# Patient Record
Sex: FEMALE | Race: WHITE | Employment: OTHER | URBAN - NONMETROPOLITAN AREA
[De-identification: names, ages, dates, MRNs, and addresses within clinical notes are randomized per-mention and may not be internally consistent; named-entity substitution may affect disease eponyms.]

---

## 2021-05-17 ENCOUNTER — OUTSIDE SERVICES (OUTPATIENT)
Dept: PRIMARY CARE CLINIC | Age: 81
End: 2021-05-17

## 2021-05-17 DIAGNOSIS — L89.92 PRESSURE INJURY, STAGE 2, UNSPECIFIED LOCATION (HCC): ICD-10-CM

## 2021-05-17 DIAGNOSIS — Z79.4 TYPE 2 DIABETES MELLITUS WITH DIABETIC NEUROPATHY, WITH LONG-TERM CURRENT USE OF INSULIN (HCC): ICD-10-CM

## 2021-05-17 DIAGNOSIS — G89.29 CHRONIC BACK PAIN GREATER THAN 3 MONTHS DURATION: Primary | ICD-10-CM

## 2021-05-17 DIAGNOSIS — E11.40 TYPE 2 DIABETES MELLITUS WITH DIABETIC NEUROPATHY, WITH LONG-TERM CURRENT USE OF INSULIN (HCC): ICD-10-CM

## 2021-05-17 DIAGNOSIS — I27.20 PHT (PULMONARY HYPERTENSION) (HCC): ICD-10-CM

## 2021-05-17 DIAGNOSIS — I48.91 ATRIAL FIBRILLATION, UNSPECIFIED TYPE (HCC): ICD-10-CM

## 2021-05-17 DIAGNOSIS — R29.898 WEAKNESS OF BOTH LEGS: ICD-10-CM

## 2021-05-17 DIAGNOSIS — I89.0 LYMPHEDEMA OF BOTH LOWER EXTREMITIES: ICD-10-CM

## 2021-05-17 DIAGNOSIS — E78.00 PURE HYPERCHOLESTEROLEMIA: ICD-10-CM

## 2021-05-17 DIAGNOSIS — S91.302D UNSPECIFIED OPEN WOUND, LEFT FOOT, SUBSEQUENT ENCOUNTER: ICD-10-CM

## 2021-05-17 DIAGNOSIS — M54.9 CHRONIC BACK PAIN GREATER THAN 3 MONTHS DURATION: Primary | ICD-10-CM

## 2021-05-17 DIAGNOSIS — J90 PLEURAL EFFUSION: ICD-10-CM

## 2021-05-17 DIAGNOSIS — I50.9 HEART FAILURE, UNSPECIFIED HF CHRONICITY, UNSPECIFIED HEART FAILURE TYPE (HCC): ICD-10-CM

## 2021-05-17 DIAGNOSIS — E66.01 MORBID OBESITY (HCC): ICD-10-CM

## 2021-05-17 NOTE — PROGRESS NOTES
Patient:  Alejandra Graft, 1940  I saw this patient on 5/17/2021, at [facility]. She had fall and back injury months ago,was in hospital and snf before coming here. Continues to have back pain ,bilat leg weakness and has pressure ulcers. Wants to try pt for ambulation. currently wheel chair bound  She is morbidly obese and not able toloose weight  Blood sugars fluctuate  She has been on pain meds for her back pain  Reason for Visit:      ICD-10-CM    1. Chronic back pain greater than 3 months duration  M54.9     G89.29    2. Type 2 diabetes mellitus with diabetic neuropathy, with long-term current use of insulin ([facility])  E11.40     Z79.4    3. Morbid obesity ([address]  E66.01    4. Lymphedema of both lower extremities  I89.0    5. Weakness of both legs  R29.898    6. Pressure injury, stage 2, unspecified location ([facility] [address]  L89.92        Changes since admission:   Has pressure ulcers  Blood sugars fluctuate  1. Fall:  none  2. Behavioral Change: anxious  3. Pain Control: adequate  4. Mobility: decreased  5. Pressure Sore:  Has stage 2 ulcers  Prior to Admission medications    Not on File     Allergies:  Patient has no allergy information on record. Past Medical History:    Past Medical History:   Diagnosis Date    Chronic back pain greater than 3 months duration     Essential hypertension     Lymphedema of both lower extremities     Morbid obesity ([address]     Type 2 diabetes mellitus with diabetic polyneuropathy, with long-term current use of insulin ([facility])        Past Surgical History:    History reviewed. No pertinent surgical history. Social History:   Social History     Tobacco Use    Smoking status: Never Smoker    Smokeless tobacco: Never Used   Substance Use Topics    Alcohol use: Never       Family History:   No family history on file.         Review of Systems:  Constitutional: negative for fevers or chills  Eyes: negative for visual disturbance   ENT: negative for sore throat or nasal congestion,negative for dysphagia  Respiratory: negative for shortness of breath , cough  Cardiovascular: neg  for chest pain , palpitations,pnd,positive for leg edema  Gastrointestinal: neg for abd pain, nausea, vomiting, diarrhea or constipation,no amena,no blood in stool  Genitourinary: negative for dysuria, urgency,hematuria or frequency  Integument/breast: negative for skin rash or lesions  Neurological: positive for bilateral weakness, numbness of legs. Muscular Skeletal: has chronic back pain , has deformity lt foot  Psych -anxious    Objective:    Vitals:   BP-130/60,Pulse-56,Respi-18,Temp-97.3  -----------------------------------------------------------------  Exam:  GEN:   A & O x3, no apparent distress,obese  EYES: No gross abnormalities. and PERRL  ENT:right and left TM normal without fluid or infection, neck without nodes and throat normal without erythema or exudate  NECK: normal, supple, no lymphadenopathy,  no carotid bruits  PULM: clear to auscultation bilaterally- no wheezes, rales or rhonchi, normal air movement, no respiratory distress  COR: regular rate & rhythm, no murmurs and no gallops  ABD:  soft, non-tender, non-distended, normal bowel sounds, no masses or organomegaly  : no cva or flank tenderness  EXT:has bilat leg edema,no calf tenderness, and warm to touch. NEURO: Motor - has bilat leg weakness and has diminished sensations of feet  PSYCH: anxious  SKIN:  Has stage 2 pressure ulcers over gluteal regions and has deformed nails  -----------------------------------------------------------------  Diagnostic Data:   · All diagnostic data was reviewed    Assessment:        ICD-10-CM    1. Chronic back pain greater than 3 months duration  M54.9     G89.29    2. Type 2 diabetes mellitus with diabetic neuropathy, with long-term current use of insulin ([facility])  E11.40     Z79.4    3. Morbid obesity ([facility] [address]  E66.01    4. Lymphedema of both lower extremities  I89.0    5.  Weakness of both legs R29.898    6. Pressure injury, stage 2, unspecified location [facility])  L89.92      There is no problem list on file for this patient.         Plan:     Pt and ot  Pain control  Monitor blood sugars,bp  Continue current medications  Treat pressure ulcers  Prevent falls    Electronically signed by Anders Fernando MD on 5/18/2021 at 9:54 AM

## 2021-05-18 ENCOUNTER — HOSPITAL ENCOUNTER (OUTPATIENT)
Age: 81
Setting detail: SPECIMEN
Discharge: HOME | End: 2021-05-18
Payer: MEDICARE

## 2021-05-18 PROBLEM — M62.82 RHABDOMYOLYSIS: Status: ACTIVE | Noted: 2021-05-18

## 2021-05-18 PROBLEM — E78.00 PURE HYPERCHOLESTEROLEMIA: Status: ACTIVE | Noted: 2021-05-18

## 2021-05-18 PROBLEM — I50.9 HEART FAILURE, UNSPECIFIED (HCC): Status: ACTIVE | Noted: 2021-05-18

## 2021-05-18 PROBLEM — I48.91 ATRIAL FIBRILLATION (HCC): Status: ACTIVE | Noted: 2021-05-18

## 2021-05-18 PROBLEM — J90 PLEURAL EFFUSION: Status: ACTIVE | Noted: 2021-05-18

## 2021-05-18 PROBLEM — I27.20 PHT (PULMONARY HYPERTENSION) (HCC): Status: ACTIVE | Noted: 2021-05-18

## 2021-05-18 PROBLEM — S91.302D UNSPECIFIED OPEN WOUND, LEFT FOOT, SUBSEQUENT ENCOUNTER: Status: ACTIVE | Noted: 2021-05-18

## 2021-05-18 PROBLEM — E10.9 DIABETES MELLITUS TYPE I (HCC): Status: ACTIVE | Noted: 2021-05-18

## 2021-05-18 LAB
ABSOLUTE EOS #: 0.41 K/UL (ref 0–0.44)
ABSOLUTE IMMATURE GRANULOCYTE: <0.03 K/UL (ref 0–0.3)
ABSOLUTE LYMPH #: 2.23 K/UL (ref 1.1–3.7)
ABSOLUTE MONO #: 0.81 K/UL (ref 0.1–1.2)
ALBUMIN SERPL-MCNC: 3.3 G/DL (ref 3.5–5.2)
ALBUMIN/GLOBULIN RATIO: 1 (ref 1–2.5)
ALP BLD-CCNC: 105 U/L (ref 35–104)
ALT SERPL-CCNC: 97 U/L (ref 5–33)
ANION GAP SERPL CALCULATED.3IONS-SCNC: 11 MMOL/L (ref 9–17)
AST SERPL-CCNC: 56 U/L
BASOPHILS # BLD: 0 % (ref 0–2)
BASOPHILS ABSOLUTE: <0.03 K/UL (ref 0–0.2)
BILIRUB SERPL-MCNC: 0.36 MG/DL (ref 0.3–1.2)
BUN BLDV-MCNC: 29 MG/DL (ref 8–23)
BUN/CREAT BLD: 30 (ref 9–20)
CALCIUM SERPL-MCNC: 9.6 MG/DL (ref 8.6–10.4)
CHLORIDE BLD-SCNC: 104 MMOL/L (ref 98–107)
CHOLESTEROL/HDL RATIO: 3.7
CHOLESTEROL: 142 MG/DL
CO2: 25 MMOL/L (ref 20–31)
CREAT SERPL-MCNC: 0.98 MG/DL (ref 0.5–0.9)
DIFFERENTIAL TYPE: ABNORMAL
EOSINOPHILS RELATIVE PERCENT: 5 % (ref 1–4)
ESTIMATED AVERAGE GLUCOSE: 134 MG/DL
GFR AFRICAN AMERICAN: >60 ML/MIN
GFR NON-AFRICAN AMERICAN: 54 ML/MIN
GFR SERPL CREATININE-BSD FRML MDRD: ABNORMAL ML/MIN/{1.73_M2}
GFR SERPL CREATININE-BSD FRML MDRD: ABNORMAL ML/MIN/{1.73_M2}
GLUCOSE BLD-MCNC: 87 MG/DL (ref 70–99)
HBA1C MFR BLD: 6.3 % (ref 4–6)
HCT VFR BLD CALC: 35.5 % (ref 36.3–47.1)
HDLC SERPL-MCNC: 38 MG/DL
HEMOGLOBIN: 11.5 G/DL (ref 11.9–15.1)
IMMATURE GRANULOCYTES: 0 %
LDL CHOLESTEROL: 87 MG/DL (ref 0–130)
LYMPHOCYTES # BLD: 28 % (ref 24–43)
MCH RBC QN AUTO: 33.1 PG (ref 25.2–33.5)
MCHC RBC AUTO-ENTMCNC: 32.4 G/DL (ref 28.4–34.8)
MCV RBC AUTO: 102.3 FL (ref 82.6–102.9)
MONOCYTES # BLD: 10 % (ref 3–12)
NRBC AUTOMATED: 0 PER 100 WBC
PDW BLD-RTO: 15.1 % (ref 11.8–14.4)
PLATELET # BLD: 241 K/UL (ref 138–453)
PLATELET ESTIMATE: ABNORMAL
PMV BLD AUTO: 10.4 FL (ref 8.1–13.5)
POTASSIUM SERPL-SCNC: 4.4 MMOL/L (ref 3.7–5.3)
RBC # BLD: 3.47 M/UL (ref 3.95–5.11)
RBC # BLD: ABNORMAL 10*6/UL
SEG NEUTROPHILS: 57 % (ref 36–65)
SEGMENTED NEUTROPHILS ABSOLUTE COUNT: 4.39 K/UL (ref 1.5–8.1)
SODIUM BLD-SCNC: 140 MMOL/L (ref 135–144)
TOTAL PROTEIN: 6.6 G/DL (ref 6.4–8.3)
TRIGL SERPL-MCNC: 86 MG/DL
TSH SERPL DL<=0.05 MIU/L-ACNC: 3.09 MIU/L (ref 0.3–5)
VLDLC SERPL CALC-MCNC: ABNORMAL MG/DL (ref 1–30)
WBC # BLD: 7.9 K/UL (ref 3.5–11.3)
WBC # BLD: ABNORMAL 10*3/UL

## 2021-05-18 PROCEDURE — 83036 HEMOGLOBIN GLYCOSYLATED A1C: CPT

## 2021-05-18 PROCEDURE — 36415 COLL VENOUS BLD VENIPUNCTURE: CPT

## 2021-05-18 PROCEDURE — 85025 COMPLETE CBC W/AUTO DIFF WBC: CPT

## 2021-05-18 PROCEDURE — P9604 ONE-WAY ALLOW PRORATED TRIP: HCPCS

## 2021-05-18 PROCEDURE — 84443 ASSAY THYROID STIM HORMONE: CPT

## 2021-05-18 PROCEDURE — 80053 COMPREHEN METABOLIC PANEL: CPT

## 2021-05-18 PROCEDURE — 80061 LIPID PANEL: CPT

## 2021-05-18 RX ORDER — BUMETANIDE 1 MG/1
1 TABLET ORAL DAILY
COMMUNITY

## 2021-05-18 RX ORDER — FENTANYL 12 UG/H
1 PATCH TRANSDERMAL
COMMUNITY

## 2021-05-18 RX ORDER — PROBENECID 500 MG/1
500 TABLET, FILM COATED ORAL 2 TIMES DAILY
COMMUNITY

## 2021-05-18 RX ORDER — INSULIN GLARGINE 100 [IU]/ML
44 INJECTION, SOLUTION SUBCUTANEOUS NIGHTLY
COMMUNITY

## 2021-05-18 RX ORDER — NICOTINE POLACRILEX 2 MG
1 GUM BUCCAL DAILY
COMMUNITY

## 2021-05-18 RX ORDER — METFORMIN HYDROCHLORIDE 500 MG/1
500 TABLET, EXTENDED RELEASE ORAL
COMMUNITY

## 2021-05-18 RX ORDER — ROPINIROLE 2 MG/1
2.5 TABLET, FILM COATED ORAL NIGHTLY
COMMUNITY

## 2021-05-18 RX ORDER — NYSTATIN 100000 [USP'U]/G
POWDER TOPICAL 2 TIMES DAILY
COMMUNITY

## 2021-05-18 RX ORDER — LEVOTHYROXINE SODIUM 0.1 MG/1
100 TABLET ORAL DAILY
COMMUNITY

## 2021-05-18 RX ORDER — POLYETHYLENE GLYCOL 3350 17 G/17G
17 POWDER, FOR SOLUTION ORAL DAILY
COMMUNITY

## 2021-05-18 RX ORDER — OXYCODONE HYDROCHLORIDE 5 MG/1
5 TABLET ORAL EVERY 4 HOURS PRN
COMMUNITY

## 2021-05-18 RX ORDER — INSULIN LISPRO 100 [IU]/ML
18 INJECTION, SOLUTION SUBCUTANEOUS
COMMUNITY

## 2021-05-18 RX ORDER — SPIRONOLACTONE 25 MG/1
25 TABLET ORAL DAILY
COMMUNITY

## 2021-05-18 RX ORDER — POTASSIUM CHLORIDE 750 MG/1
10 CAPSULE, EXTENDED RELEASE ORAL DAILY
COMMUNITY

## 2021-05-18 RX ORDER — ALENDRONATE SODIUM 70 MG/1
70 TABLET ORAL
COMMUNITY

## 2021-05-18 RX ORDER — M-VIT,TX,IRON,MINS/CALC/FOLIC 27MG-0.4MG
1 TABLET ORAL DAILY
COMMUNITY

## 2021-05-18 RX ORDER — INSULIN LISPRO 100 [IU]/ML
12 INJECTION, SOLUTION SUBCUTANEOUS 2 TIMES DAILY
COMMUNITY

## 2021-05-18 RX ORDER — ACETAMINOPHEN 500 MG
1000 TABLET ORAL EVERY 6 HOURS PRN
COMMUNITY

## 2021-05-18 RX ORDER — DIPHENHYDRAMINE HCL 25 MG
25 TABLET ORAL
COMMUNITY

## 2021-05-18 SDOH — HEALTH STABILITY: PHYSICAL HEALTH: ON AVERAGE, HOW MANY MINUTES DO YOU ENGAGE IN EXERCISE AT THIS LEVEL?: 0 MIN

## 2021-05-19 ENCOUNTER — OUTSIDE SERVICES (OUTPATIENT)
Dept: PRIMARY CARE CLINIC | Age: 81
End: 2021-05-19

## 2021-05-19 DIAGNOSIS — Z79.4 TYPE 2 DIABETES MELLITUS WITH DIABETIC NEUROPATHY, WITH LONG-TERM CURRENT USE OF INSULIN (HCC): ICD-10-CM

## 2021-05-19 DIAGNOSIS — I48.91 ATRIAL FIBRILLATION, UNSPECIFIED TYPE (HCC): ICD-10-CM

## 2021-05-19 DIAGNOSIS — E11.40 TYPE 2 DIABETES MELLITUS WITH DIABETIC NEUROPATHY, WITH LONG-TERM CURRENT USE OF INSULIN (HCC): ICD-10-CM

## 2021-05-19 DIAGNOSIS — G89.29 CHRONIC BACK PAIN GREATER THAN 3 MONTHS DURATION: Primary | ICD-10-CM

## 2021-05-19 DIAGNOSIS — E66.01 MORBID OBESITY (HCC): ICD-10-CM

## 2021-05-19 DIAGNOSIS — L89.92 PRESSURE INJURY, STAGE 2, UNSPECIFIED LOCATION (HCC): ICD-10-CM

## 2021-05-19 DIAGNOSIS — R29.898 WEAKNESS OF BOTH LEGS: ICD-10-CM

## 2021-05-19 DIAGNOSIS — M54.9 CHRONIC BACK PAIN GREATER THAN 3 MONTHS DURATION: Primary | ICD-10-CM

## 2021-05-19 NOTE — PROGRESS NOTES
MD   insulin glargine (LANTUS SOLOSTAR) 100 UNIT/ML injection pen Inject 44 Units into the skin nightly    Historical Provider, MD   levothyroxine (SYNTHROID) 100 MCG tablet Take 100 mcg by mouth Daily    Historical Provider, MD   Melatonin 1 MG CAPS Take 1 mg by mouth nightly    Historical Provider, MD   metFORMIN (GLUCOPHAGE-XR) 500 MG extended release tablet Take 500 mg by mouth daily (with breakfast)    Historical Provider, MD   magnesium hydroxide (MILK OF MAGNESIA) 400 MG/5ML suspension Take 30 mLs by mouth daily as needed for Constipation    Historical Provider, MD   Multiple Vitamins-Minerals (THERAPEUTIC MULTIVITAMIN-MINERALS) tablet Take 1 tablet by mouth daily    Historical Provider, MD   nystatin (MYCOSTATIN) 527773 UNIT/GM powder Apply topically 2 times daily Apply topically 4 times daily. Historical Provider, MD   oxyCODONE (ROXICODONE) 5 MG immediate release tablet Take 5 mg by mouth every 4 hours as needed for Pain.     Historical Provider, MD   potassium chloride (MICRO-K) 10 MEQ extended release capsule Take 10 mEq by mouth daily    Historical Provider, MD   probenecid (BENEMID) 500 MG tablet Take 500 mg by mouth 2 times daily    Historical Provider, MD   rOPINIRole (REQUIP) 0.5 MG tablet Take 0.5 mg by mouth daily    Historical Provider, MD   spironolactone (ALDACTONE) 25 MG tablet Take 25 mg by mouth daily    Historical Provider, MD   rivaroxaban (XARELTO) 20 MG TABS tablet Take 20 mg by mouth nightly    Historical Provider, MD     Allergies:  Daypro [oxaprozin], Sulindac, Uloric [febuxostat], and Allopurinol    Past Medical History:    Past Medical History:   Diagnosis Date    Atrial fibrillation ([facility] [address] 5/18/2021    Chronic back pain greater than 3 months duration     Chronic kidney disease     Diabetes mellitus type I ([facility] [address] 5/18/2021    Essential hypertension     Heart failure, unspecified ([facility] 5/18/2021    Lymphedema of both lower extremities     Morbid obesity ([facility] [address]     PHT EXT:has bilat leg edema,no calf tenderness, and warm to touch. NEURO: Motor - has bilat leg weakness and has diminished sensations of feet  PSYCH: anxious  SKIN:  Has stage 2 pressure ulcers over gluteal regions and has deformed nails  -----------------------------------------------------------------  Diagnostic Data:   · All diagnostic data was reviewed    Assessment:        ICD-10-CM    1. Chronic back pain greater than 3 months duration  M54.9     G89.29    2. Type 2 diabetes mellitus with diabetic neuropathy, with long-term current use of insulin ([facility])  E11.40     Z79.4    3. Morbid obesity ([facility]  E66.01    4. Weakness of both legs  R29.898    5. Pressure injury, stage 2, unspecified location ([facility]  L89.92    6.  Atrial fibrillation, unspecified type [facility])  I48.91      Patient Active Problem List   Diagnosis Code    Unspecified open wound, left foot, subsequent encounter S91.302D    Heart failure, unspecified ([facility] I50.9    Pure hypercholesterolemia E78.00    PHT (pulmonary hypertension) ([facility] I27.20    Rhabdomyolysis M62.82    Pleural effusion J90    Atrial fibrillation ([facility] I48.91    Diabetes mellitus type I ([facility] E10.9         Plan:     Pt and ot  Pain control  Monitor blood sugars,bp  Continue current medications  Treat pressure ulcers  Prevent falls    Electronically signed by K[address], MD on 5/21/2021 at 10:52 AM

## 2021-05-24 ENCOUNTER — OUTSIDE SERVICES (OUTPATIENT)
Dept: FAMILY MEDICINE CLINIC | Age: 81
End: 2021-05-24

## 2021-05-24 DIAGNOSIS — R29.898 BILATERAL LEG WEAKNESS: ICD-10-CM

## 2021-05-24 DIAGNOSIS — E66.01 MORBID OBESITY (HCC): ICD-10-CM

## 2021-05-24 DIAGNOSIS — G89.29 OTHER CHRONIC BACK PAIN: Primary | ICD-10-CM

## 2021-05-24 DIAGNOSIS — I48.91 ATRIAL FIBRILLATION, UNSPECIFIED TYPE (HCC): ICD-10-CM

## 2021-05-24 DIAGNOSIS — M54.9 OTHER CHRONIC BACK PAIN: Primary | ICD-10-CM

## 2021-05-24 NOTE — PROGRESS NOTES
100 MCG tablet Take 100 mcg by mouth Daily    Historical Provider, MD   Melatonin 1 MG CAPS Take 1 mg by mouth nightly    Historical Provider, MD   metFORMIN (GLUCOPHAGE-XR) 500 MG extended release tablet Take 500 mg by mouth daily (with breakfast)    Historical Provider, MD   magnesium hydroxide (MILK OF MAGNESIA) 400 MG/5ML suspension Take 30 mLs by mouth daily as needed for Constipation    Historical Provider, MD   Multiple Vitamins-Minerals (THERAPEUTIC MULTIVITAMIN-MINERALS) tablet Take 1 tablet by mouth daily    Historical Provider, MD   nystatin (MYCOSTATIN) 027002 UNIT/GM powder Apply topically 2 times daily Apply topically 4 times daily. Historical Provider, MD   oxyCODONE (ROXICODONE) 5 MG immediate release tablet Take 5 mg by mouth every 4 hours as needed for Pain.     Historical Provider, MD   potassium chloride (MICRO-K) 10 MEQ extended release capsule Take 10 mEq by mouth daily    Historical Provider, MD   probenecid (BENEMID) 500 MG tablet Take 500 mg by mouth 2 times daily    Historical Provider, MD   rOPINIRole (REQUIP) 0.5 MG tablet Take 0.5 mg by mouth daily    Historical Provider, MD   spironolactone (ALDACTONE) 25 MG tablet Take 25 mg by mouth daily    Historical Provider, MD   rivaroxaban (XARELTO) 20 MG TABS tablet Take 20 mg by mouth nightly    Historical Provider, MD     Allergies:  Daypro [oxaprozin], Sulindac, Uloric [febuxostat], and Allopurinol    Past Medical History:    Past Medical History:   Diagnosis Date    Atrial fibrillation ([facility] [address] 5/18/2021    Chronic back pain greater than 3 months duration     Chronic kidney disease     Diabetes mellitus type I ([facility] [address] 5/18/2021    Essential hypertension     Heart failure, unspecified ([facility] [address] 5/18/2021    Lymphedema of both lower extremities     Morbid obesity ([address]     PHT (pulmonary hypertension) ([facility] 5/18/2021    Pleural effusion 5/18/2021    Pure hypercholesterolemia 5/18/2021    Rhabdomyolysis 5/18/2021    Sleep apnea     Type 2 diabetes mellitus with diabetic polyneuropathy, with long-term current use of insulin ([facility] [address]        Past Surgical History:    No past surgical history on file. Social History:   Social History     Tobacco Use    Smoking status: Never Smoker    Smokeless tobacco: Never Used   Substance Use Topics    Alcohol use: Never       Family History:   No family history on file. Review of Systems:  Constitutional: negative for fevers or chills  Eyes: negative for visual disturbance   ENT: negative for sore throat or nasal congestion,negative for dysphagia  Respiratory: negative for shortness of breath , cough  Cardiovascular: neg  for chest pain , palpitations,pnd,positive for leg edema  Gastrointestinal: neg for abd pain, nausea, vomiting, diarrhea or constipation,no amena,no blood in stool  Genitourinary: negative for dysuria, urgency,hematuria or frequency  Integument/breast: negative for skin rash or lesions  Neurological: positive for bilateral weakness, numbness of legs. Muscular Skeletal: has chronic back pain , has deformity lt foot  Psych -anxious    Objective:    Vitals:   BP-138/76,Pulse-66,Respi-18,Temp-97.0  -----------------------------------------------------------------  Exam:  GEN:   A & O x3, no apparent distress,obese  EYES: No gross abnormalities. and PERRL  ENT:right and left TM normal without fluid or infection, neck without nodes and throat normal without erythema or exudate  NECK: normal, supple, no lymphadenopathy,  no carotid bruits  PULM: clear to auscultation bilaterally- no wheezes, rales or rhonchi, normal air movement, no respiratory distress  COR: regular rate & rhythm, no murmurs and no gallops  ABD:  soft, non-tender, non-distended, normal bowel sounds, no masses or organomegaly  : no cva or flank tenderness  EXT:has bilat leg edema,no calf tenderness, and warm to touch.    NEURO: Motor - has bilat leg weakness and has diminished sensations of feet  PSYCH: anxious  SKIN:  Has

## 2021-05-26 ENCOUNTER — OUTSIDE SERVICES (OUTPATIENT)
Dept: PRIMARY CARE CLINIC | Age: 81
End: 2021-05-26

## 2021-05-26 DIAGNOSIS — E11.40 TYPE 2 DIABETES MELLITUS WITH DIABETIC NEUROPATHY, WITH LONG-TERM CURRENT USE OF INSULIN (HCC): ICD-10-CM

## 2021-05-26 DIAGNOSIS — R29.898 WEAKNESS OF BOTH LEGS: ICD-10-CM

## 2021-05-26 DIAGNOSIS — L89.92 PRESSURE INJURY, STAGE 2, UNSPECIFIED LOCATION (HCC): ICD-10-CM

## 2021-05-26 DIAGNOSIS — G89.29 CHRONIC BACK PAIN GREATER THAN 3 MONTHS DURATION: Primary | ICD-10-CM

## 2021-05-26 DIAGNOSIS — Z79.4 TYPE 2 DIABETES MELLITUS WITH DIABETIC NEUROPATHY, WITH LONG-TERM CURRENT USE OF INSULIN (HCC): ICD-10-CM

## 2021-05-26 DIAGNOSIS — M54.9 CHRONIC BACK PAIN GREATER THAN 3 MONTHS DURATION: Primary | ICD-10-CM

## 2021-05-26 DIAGNOSIS — E66.01 MORBID OBESITY (HCC): ICD-10-CM

## 2021-05-26 NOTE — PROGRESS NOTES
Patient:  Brendan Browning, 1940  I saw this patient on 5/26/2021, at [facility]. Has pain on movement  Continues to have back pain ,bilat leg weakness and has pressure ulcers. inspite of her pain meds,still rates her pain high  Blood sugars fluctuate    Reason for Visit:      ICD-10-CM    1. Chronic back pain greater than 3 months duration  M54.9     G89.29    2. Type 2 diabetes mellitus with diabetic neuropathy, with long-term current use of insulin ([facility])  E11.40     Z79.4    3. Morbid obesity ([facility] [address]  E66.01    4. Weakness of both legs  R29.898    5. Pressure injury, stage 2, unspecified location ([facility] [address]  L89.92        Changes since last visit:   Has pressure ulcers  Has pain  Blood sugars fluctuate  1. Fall:  none  2. Behavioral Change: anxious  3. Pain Control: inadequate  4. Mobility: decreased  5. Pressure Sore:  Has stage 2 ulcers  Prior to Admission medications    Medication Sig Start Date End Date Taking? Authorizing Provider   acetaminophen (TYLENOL) 500 MG tablet Take 1,000 mg by mouth every 6 hours as needed for Pain    Historical Provider, MD   alendronate (FOSAMAX) 70 MG tablet Take 70 mg by mouth every 7 days    Historical Provider, MD   diphenhydrAMINE (BENADRYL) 25 MG tablet Take 25 mg by mouth every 8-12 hours as needed for Itching    Historical Provider, MD   Biotin 1 MG CAPS Take 1 mg by mouth daily    Historical Provider, MD   bumetanide (BUMEX) 1 MG tablet Take 1 mg by mouth daily    Historical Provider, MD   fentaNYL (DURAGESIC) 12 MCG/HR Place 1 patch onto the skin every 72 hours.     Historical Provider, MD   polyethylene glycol (MIRALAX) 17 GM/SCOOP powder Take 17 g by mouth daily    Historical Provider, MD   insulin lispro, 0.5 Unit Dial, (HUMALOG KINGS KWIKPEN) 100 UNIT/ML SOPN Inject 12 Units into the skin 2 times daily    Historical Provider, MD   insulin lispro, 0.5 Unit Dial, (HUMALOG KINGS KWIKPEN) 100 UNIT/ML SOPN Inject 18 Units into the skin Daily with lunch Historical Provider, MD   insulin glargine (LANTUS SOLOSTAR) 100 UNIT/ML injection pen Inject 44 Units into the skin nightly    Historical Provider, MD   levothyroxine (SYNTHROID) 100 MCG tablet Take 100 mcg by mouth Daily    Historical Provider, MD   Melatonin 1 MG CAPS Take 1 mg by mouth nightly    Historical Provider, MD   metFORMIN (GLUCOPHAGE-XR) 500 MG extended release tablet Take 500 mg by mouth daily (with breakfast)    Historical Provider, MD   magnesium hydroxide (MILK OF MAGNESIA) 400 MG/5ML suspension Take 30 mLs by mouth daily as needed for Constipation    Historical Provider, MD   Multiple Vitamins-Minerals (THERAPEUTIC MULTIVITAMIN-MINERALS) tablet Take 1 tablet by mouth daily    Historical Provider, MD   nystatin (MYCOSTATIN) 543719 UNIT/GM powder Apply topically 2 times daily Apply topically 4 times daily. Historical Provider, MD   oxyCODONE (ROXICODONE) 5 MG immediate release tablet Take 5 mg by mouth every 4 hours as needed for Pain.     Historical Provider, MD   potassium chloride (MICRO-K) 10 MEQ extended release capsule Take 10 mEq by mouth daily    Historical Provider, MD   probenecid (BENEMID) 500 MG tablet Take 500 mg by mouth 2 times daily    Historical Provider, MD   rOPINIRole (REQUIP) 0.5 MG tablet Take 0.5 mg by mouth daily    Historical Provider, MD   spironolactone (ALDACTONE) 25 MG tablet Take 25 mg by mouth daily    Historical Provider, MD   rivaroxaban (XARELTO) 20 MG TABS tablet Take 20 mg by mouth nightly    Historical Provider, MD     Allergies:  Daypro [oxaprozin], Sulindac, Uloric [febuxostat], and Allopurinol    Past Medical History:    Past Medical History:   Diagnosis Date    Atrial fibrillation ([facility] [address] 5/18/2021    Chronic back pain greater than 3 months duration     Chronic kidney disease     Diabetes mellitus type I ([facility] [address] 5/18/2021    Essential hypertension     Heart failure, unspecified ([facility] [address] 5/18/2021    Lymphedema of both lower extremities     Morbid obesity ([facility]     PHT (pulmonary hypertension) ([facility] 5/18/2021    Pleural effusion 5/18/2021    Pure hypercholesterolemia 5/18/2021    Rhabdomyolysis 5/18/2021    Sleep apnea     Type 2 diabetes mellitus with diabetic polyneuropathy, with long-term current use of insulin ([facility]        Past Surgical History:    No past surgical history on file. Social History:   Social History     Tobacco Use    Smoking status: Never Smoker    Smokeless tobacco: Never Used   Substance Use Topics    Alcohol use: Never       Family History:   No family history on file. Review of Systems:  Constitutional: negative for fevers or chills  Eyes: negative for visual disturbance   ENT: negative for sore throat or nasal congestion,negative for dysphagia  Respiratory: negative for shortness of breath , cough  Cardiovascular: neg  for chest pain , palpitations,pnd,positive for leg edema  Gastrointestinal: neg for abd pain, nausea, vomiting, diarrhea or constipation,no amena,no blood in stool  Genitourinary: negative for dysuria, urgency,hematuria or frequency  Integument/breast: negative for skin rash or lesions  Neurological: positive for bilateral weakness, numbness of legs. Muscular Skeletal: has chronic back pain , has deformity lt foot  Psych -anxious    Objective:    Vitals:   BP-128/68,Pulse-68,Respi-18,Temp-97.6  -----------------------------------------------------------------  Exam:  GEN:   A & O x3, no apparent distress,obese  EYES: No gross abnormalities.  and PERRL  ENT:right and left TM normal without fluid or infection, neck without nodes and throat normal without erythema or exudate  NECK: normal, supple, no lymphadenopathy,  no carotid bruits  PULM: clear to auscultation bilaterally- no wheezes, rales or rhonchi, normal air movement, no respiratory distress  COR: regular rate & rhythm, no murmurs and no gallops  ABD:  soft, non-tender, non-distended, normal bowel sounds, no masses or organomegaly  : no cva or flank

## 2021-05-29 ENCOUNTER — HOSPITAL ENCOUNTER (EMERGENCY)
Age: 81
Discharge: ANOTHER HOSPITAL | End: 2021-05-30
Attending: EMERGENCY MEDICINE
Payer: MEDICARE

## 2021-05-29 DIAGNOSIS — S22.000A COMPRESSION FRACTURE OF BODY OF THORACIC VERTEBRA (HCC): Primary | ICD-10-CM

## 2021-05-29 PROCEDURE — 51702 INSERT TEMP BLADDER CATH: CPT

## 2021-05-29 PROCEDURE — 6360000002 HC RX W HCPCS: Performed by: EMERGENCY MEDICINE

## 2021-05-29 PROCEDURE — 96374 THER/PROPH/DIAG INJ IV PUSH: CPT

## 2021-05-29 PROCEDURE — 99285 EMERGENCY DEPT VISIT HI MDM: CPT

## 2021-05-29 PROCEDURE — 96375 TX/PRO/DX INJ NEW DRUG ADDON: CPT

## 2021-05-29 RX ORDER — DULOXETIN HYDROCHLORIDE 20 MG/1
20 CAPSULE, DELAYED RELEASE ORAL DAILY
COMMUNITY

## 2021-05-29 RX ORDER — FENTANYL CITRATE 50 UG/ML
25 INJECTION, SOLUTION INTRAMUSCULAR; INTRAVENOUS ONCE
Status: COMPLETED | OUTPATIENT
Start: 2021-05-30 | End: 2021-05-29

## 2021-05-29 RX ORDER — ONDANSETRON 2 MG/ML
4 INJECTION INTRAMUSCULAR; INTRAVENOUS ONCE
Status: COMPLETED | OUTPATIENT
Start: 2021-05-30 | End: 2021-05-29

## 2021-05-29 RX ADMIN — ONDANSETRON 4 MG: 2 INJECTION INTRAMUSCULAR; INTRAVENOUS at 23:59

## 2021-05-29 RX ADMIN — FENTANYL CITRATE 25 MCG: 50 INJECTION, SOLUTION INTRAMUSCULAR; INTRAVENOUS at 23:58

## 2021-05-29 ASSESSMENT — PAIN DESCRIPTION - ORIENTATION: ORIENTATION: MID

## 2021-05-29 ASSESSMENT — PAIN SCALES - GENERAL
PAINLEVEL_OUTOF10: 10
PAINLEVEL_OUTOF10: 10

## 2021-05-29 ASSESSMENT — PAIN DESCRIPTION - LOCATION: LOCATION: BACK

## 2021-05-29 ASSESSMENT — PAIN DESCRIPTION - PAIN TYPE: TYPE: ACUTE PAIN

## 2021-05-29 ASSESSMENT — PAIN DESCRIPTION - DESCRIPTORS: DESCRIPTORS: SPASM

## 2021-05-30 ENCOUNTER — APPOINTMENT (OUTPATIENT)
Dept: CT IMAGING | Age: 81
End: 2021-05-30
Payer: MEDICARE

## 2021-05-30 VITALS
HEART RATE: 83 BPM | TEMPERATURE: 97 F | RESPIRATION RATE: 18 BRPM | DIASTOLIC BLOOD PRESSURE: 43 MMHG | OXYGEN SATURATION: 92 % | SYSTOLIC BLOOD PRESSURE: 175 MMHG

## 2021-05-30 LAB — GLUCOSE BLD-MCNC: 83 MG/DL

## 2021-05-30 PROCEDURE — 6370000000 HC RX 637 (ALT 250 FOR IP): Performed by: FAMILY MEDICINE

## 2021-05-30 PROCEDURE — 72131 CT LUMBAR SPINE W/O DYE: CPT

## 2021-05-30 PROCEDURE — 72128 CT CHEST SPINE W/O DYE: CPT

## 2021-05-30 PROCEDURE — 72125 CT NECK SPINE W/O DYE: CPT

## 2021-05-30 RX ORDER — HYDROCODONE BITARTRATE AND ACETAMINOPHEN 5; 325 MG/1; MG/1
1 TABLET ORAL ONCE
Status: COMPLETED | OUTPATIENT
Start: 2021-05-30 | End: 2021-05-30

## 2021-05-30 RX ORDER — ACETAMINOPHEN 325 MG/1
650 TABLET ORAL ONCE
Status: COMPLETED | OUTPATIENT
Start: 2021-05-30 | End: 2021-05-30

## 2021-05-30 RX ADMIN — ACETAMINOPHEN 650 MG: 325 TABLET ORAL at 08:08

## 2021-05-30 RX ADMIN — HYDROCODONE BITARTRATE AND ACETAMINOPHEN 1 TABLET: 5; 325 TABLET ORAL at 08:51

## 2021-05-30 ASSESSMENT — PAIN SCALES - GENERAL
PAINLEVEL_OUTOF10: 5
PAINLEVEL_OUTOF10: 4

## 2021-05-30 ASSESSMENT — ENCOUNTER SYMPTOMS
BACK PAIN: 1
RESPIRATORY NEGATIVE: 1
EYES NEGATIVE: 1
GASTROINTESTINAL NEGATIVE: 1

## 2021-05-30 NOTE — ED PROVIDER NOTES
[address] [place] [address] ED  EMERGENCY DEPARTMENT ENCOUNTER      Pt Name: Love Doss  MRN: [id number]  [place] 1940  Date of evaluation: 5/29/2021  Provider: Elisa Mccullough MD    [address] [place] [address]       Chief Complaint   Patient presents with    Back Pain     mid back, onset yesterday after standing with gait belt. Patient states she felt a pop. Describes spasm pain this evening, not relieved with oxycodone/benadryl/tylenol. HISTORY OF PRESENT ILLNESS   (Location/Symptom, Timing/Onset, Context/Setting, Quality, Duration, Modifying Factors, Severity)  Note limiting factors. Love Doss is a 79 y.o. female who presents to the emergency department     79-year-old very pleasant female presents emergency department with a history for back discomfort. Patient relates that she broke her back in a number of places necessitating hospitalization at [address] [place] [address] in January. The patient currently resides at [facility]. Earlier today the patient related that she felt her back pop in her mid to lower back region when being placed in a stand lift device. The patient after sustaining her back fractures in January has had limited mobility of her legs. There is been no bowel or bladder incontinence. Nursing Notes were reviewed. REVIEW OF SYSTEMS    (2-9 systems for level 4, 10 or more for level 5)     Review of Systems   Constitutional: Negative. HENT: Negative. Eyes: Negative. Respiratory: Negative. Cardiovascular: Negative. Gastrointestinal: Negative. Endocrine: Negative. Genitourinary: Negative. Musculoskeletal: Positive for back pain and gait problem. Skin: Negative. Neurological: Positive for weakness (Involving her legs (since initial back fractures January 2021). Hematological: Negative. Except as noted above the remainder of the review of systems was reviewed and negative.        PAST MEDICAL HISTORY     Past Medical History: Diagnosis Date    Atrial fibrillation ([facility] 5/18/2021    Chronic back pain greater than 3 months duration     Chronic kidney disease     Diabetes mellitus type I ([facility] 5/18/2021    Essential hypertension     Heart failure, unspecified ([facility] 5/18/2021    Lymphedema of both lower extremities     Morbid obesity ([facility]     PHT (pulmonary hypertension) ([facility] 5/18/2021    Pleural effusion 5/18/2021    Pure hypercholesterolemia 5/18/2021    Rhabdomyolysis 5/18/2021    Sleep apnea     Type 2 diabetes mellitus with diabetic polyneuropathy, with long-term current use of insulin ([facility]          SURGICAL HISTORY     No past surgical history on file. CURRENT MEDICATIONS       Previous Medications    ACETAMINOPHEN (TYLENOL) 500 MG TABLET    Take 1,000 mg by mouth every 6 hours as needed for Pain    ALENDRONATE (FOSAMAX) 70 MG TABLET    Take 70 mg by mouth every 7 days    BIOTIN 1 MG CAPS    Take 1 mg by mouth daily    BUMETANIDE (BUMEX) 1 MG TABLET    Take 1 mg by mouth daily    DIPHENHYDRAMINE (BENADRYL) 25 MG TABLET    Take 25 mg by mouth every 8-12 hours as needed for Itching    DULOXETINE (CYMBALTA) 20 MG EXTENDED RELEASE CAPSULE    Take 20 mg by mouth daily    FENTANYL (DURAGESIC) 12 MCG/HR    Place 1 patch onto the skin every 72 hours.     INSULIN GLARGINE (LANTUS SOLOSTAR) 100 UNIT/ML INJECTION PEN    Inject 44 Units into the skin nightly    INSULIN LISPRO, 0.5 UNIT DIAL, (HUMALOG KINGS KWIKPEN) 100 UNIT/ML SOPN    Inject 12 Units into the skin 2 times daily    INSULIN LISPRO, 0.5 UNIT DIAL, (HUMALOG KINGS KWIKPEN) 100 UNIT/ML SOPN    Inject 18 Units into the skin Daily with lunch    LEVOTHYROXINE (SYNTHROID) 100 MCG TABLET    Take 100 mcg by mouth Daily    MAGNESIUM HYDROXIDE (MILK OF MAGNESIA) 400 MG/5ML SUSPENSION    Take 30 mLs by mouth daily as needed for Constipation    MELATONIN 1 MG CAPS    Take 1 mg by mouth nightly    METFORMIN (GLUCOPHAGE-XR) 500 MG EXTENDED RELEASE TABLET    Take 500 mg by Stress:     Feeling of Stress :    Social Connections:     Frequency of Communication with Friends and Family:     Frequency of Social Gatherings with Friends and Family:     Attends Buddhist Services:     Active Member of Clubs or Organizations:     Attends Club or Organization Meetings:     Marital Status:    Intimate Partner Violence:     Fear of Current or Ex-Partner:     Emotionally Abused:     Physically Abused:     Sexually Abused:        SCREENINGS        [place] Coma Scale  Eye Opening: Spontaneous  Best Verbal Response: Oriented  Best Motor Response: Obeys commands  [place] Coma Scale Score: 15               PHYSICAL EXAM    (up to 7 for level 4, 8 or more for level 5)     ED Triage Vitals [05/29/21 2333]   BP Temp Temp Source Pulse Resp SpO2 Height Weight   (!) 183/55 97 °F (36.1 °C) Temporal 83 18 96 % -- --       Physical Exam  Constitutional:       Comments: Uncomfortable   HENT:      Head: Normocephalic and atraumatic. Nose: Nose normal.      Mouth/Throat:      Mouth: Mucous membranes are moist.   Eyes:      Extraocular Movements: Extraocular movements intact. Pupils: Pupils are equal, round, and reactive to light. Neck:      Vascular: No carotid bruit. Cardiovascular:      Rate and Rhythm: Normal rate. Rhythm irregular. Pulses: Normal pulses. Pulmonary:      Effort: Pulmonary effort is normal.      Comments: Slightly decreased breath sounds bilateral bases  Abdominal:      General: Abdomen is flat. There is distension. Palpations: There is no mass. Tenderness: There is no abdominal tenderness. There is no guarding or rebound. Hernia: No hernia is present. Musculoskeletal:      Cervical back: Normal range of motion. No rigidity or tenderness. Right lower leg: Edema present. Left lower leg: Edema present.       Comments: Patient does have movement of bilateral feet with limited push pull however per the patient this is unchanged 05/29/21 2334 05/30/21 0002   BP: (!) 183/55 (!) 183/55 (!) 164/46   Pulse: 83     Resp: 18     Temp: 97 °F (36.1 °C)     TempSrc: Temporal     SpO2: 96%  96%         MDM  Number of Diagnoses or Management Options  Compression fracture of body of thoracic vertebra [facility])  Diagnosis management comments: 19-year-old female presents emergency department after feeling a pop in her back when being lifted with a lift assist device prior to ED arrival.  Patient troy has had previous fractures involving her mid lower back. She has been treated at [facility] in January. Patient has been nonambulatory in the interim. She denies any associated chest or abdominal discomfort. She admittedly also has decreased sensation in her legs which is not new. Diagnosis considered-acute on chronic fractures thoracic lumbar spine, lumbar strain,    CT thoracic lumbar spine demonstrates acute on chronic anterior wedge fracture T11 with adjacent soft tissue edema  Consultation undertaken with Dr. Leslie Schulz on call for Raphael Flood will accept the patient in transfer to the [address] [place] [address]     Patient remains comfortable after receiving IV fentanyl 25 mcg-blood sugar per Dexcom scan 117  ED Course as of May 30 0247   Sun May 30, 2021   0127 Cervical spine CT no acute process radiologist read   CT Cervical Spine WO Contrast [RS]   0155 CT lumbar spine thoracic spine acute on chronic T11 fracture-with associated with soft tissue edema-- with nonsignificant minimal retropulsion burst component   CT THORACIC SPINE WO CONTRAST [RS]      ED Course User Index  [RS] Tamra Laws MD         CRITICAL CARE TIME   Total Critical Care time was minutes, excluding separately reportable procedures. There was a high probability of clinically significant/life threatening deterioration in the patient's condition which required my urgent intervention.      CONSULTS:  None    PROCEDURES:  Unless otherwise noted below, none     Procedures    FINAL IMPRESSION      1. Compression fracture of body of thoracic vertebra [facility])          DISPOSITION/PLAN   DISPOSITION Decision To Transfer 05/30/2021 02:47:29 AM      PATIENT REFERRED TO:  No follow-up provider specified. DISCHARGE MEDICATIONS:  New Prescriptions    No medications on file     Controlled Substances Monitoring:     No flowsheet data found.     (Please note that portions of this note were completed with a voice recognition program.  Efforts were made to edit the dictations but occasionally words are mis-transcribed.)    Marquita Prieto MD (electronically signed)  Attending Emergency Physician            Marquita Prieto MD  05/30/21 8164

## 2021-05-30 NOTE — ED NOTES
Patient's glucose checked with patient's dexcom meter with reading of 117. Dr. Amber Floyd notified with no new orders.         Abbey Wilks RN  05/30/21 3444

## 2021-05-30 NOTE — ED NOTES
Updated patient daughter Ru Maradiaga on plan to transfer to [facility].         Abel Arora RN  05/30/21 2326

## 2021-05-30 NOTE — ED NOTES
[employer] for consult with [facility]-pt est, waiting for call back.       Melvin Peer  05/30/21 1477

## 2021-06-01 ENCOUNTER — HOSPITAL ENCOUNTER (OUTPATIENT)
Age: 81
Setting detail: SPECIMEN
Discharge: HOME | End: 2021-06-01
Payer: MEDICARE

## 2021-06-07 ENCOUNTER — HOSPITAL ENCOUNTER (OUTPATIENT)
Age: 81
Setting detail: SPECIMEN
Discharge: HOME | End: 2021-06-07
Payer: MEDICARE

## 2023-06-26 ENCOUNTER — HOSPITAL ENCOUNTER (OUTPATIENT)
Dept: HOSPITAL 101 - LAB | Age: 83
Discharge: HOME | End: 2023-06-26
Payer: MEDICARE

## 2023-06-26 DIAGNOSIS — R39.9: Primary | ICD-10-CM

## 2023-06-26 LAB — GLUCOSE URINE UA: NEGATIVE MG/DL

## 2023-06-26 PROCEDURE — 87150 DNA/RNA AMPLIFIED PROBE: CPT

## 2023-06-26 PROCEDURE — 87086 URINE CULTURE/COLONY COUNT: CPT

## 2023-06-26 PROCEDURE — 87186 SC STD MICRODIL/AGAR DIL: CPT

## 2023-06-26 PROCEDURE — 81003 URINALYSIS AUTO W/O SCOPE: CPT

## 2023-07-14 ENCOUNTER — HOSPITAL ENCOUNTER
Dept: HOSPITAL 101 - CARD | Age: 83
Discharge: HOME | End: 2023-07-14
Payer: MEDICARE

## 2023-07-14 DIAGNOSIS — R60.0: Primary | ICD-10-CM

## 2023-07-14 DIAGNOSIS — R00.2: ICD-10-CM

## 2023-07-14 DIAGNOSIS — N39.0: ICD-10-CM

## 2023-07-14 DIAGNOSIS — I10: ICD-10-CM

## 2023-07-14 DIAGNOSIS — I50.9: ICD-10-CM

## 2023-07-14 LAB
ANION GAP: 10.4
BLOOD UREA NITROGEN: 26 MG/DL (ref 7–18)
CALCIUM: 9.5 MG/DL (ref 8.5–10.1)
CARBON DIOXIDE: 32 MMOL/L (ref 21–32)
CHLORIDE: 100 MMOL/L (ref 98–107)
CO2 BLD-SCNC: 32 MMOL/L (ref 21–32)
ESTIMATED GFR (AFRICAN AMERICA: 44 (ref 60–?)
ESTIMATED GFR (NON-AFRICAN AME: 36 (ref 60–?)
GLUCOSE BLD-MCNC: 196 MG/DL (ref 74–106)
NT PRO B TYPE NATRIURETIC PEPT: 166 PG/ML (ref ?–1800)
POTASSIUM SERPLBLD-SCNC: 4.4 MMOL/L (ref 3.5–5.1)
POTASSIUM: 4.4 MMOL/L (ref 3.5–5.1)
SODIUM BLD-SCNC: 138 MMOL/L (ref 136–145)
SODIUM: 138 MMOL/L (ref 136–145)

## 2023-07-14 PROCEDURE — 83880 ASSAY OF NATRIURETIC PEPTIDE: CPT

## 2023-07-14 PROCEDURE — 80048 BASIC METABOLIC PNL TOTAL CA: CPT

## 2023-07-14 PROCEDURE — 93306 TTE W/DOPPLER COMPLETE: CPT

## 2023-07-14 PROCEDURE — 36415 COLL VENOUS BLD VENIPUNCTURE: CPT

## 2023-07-19 ENCOUNTER — HOSPITAL ENCOUNTER (OUTPATIENT)
Dept: HOSPITAL 101 - LAB | Age: 83
Discharge: HOME | End: 2023-07-19
Payer: MEDICARE

## 2023-07-19 DIAGNOSIS — N39.0: Primary | ICD-10-CM

## 2023-07-19 LAB
CAST SEEN?: (no result) #/LPF
GLUCOSE URINE UA: NEGATIVE MG/DL
URINE CULTURE INDICATED: YES

## 2023-07-19 PROCEDURE — 87186 SC STD MICRODIL/AGAR DIL: CPT

## 2023-07-19 PROCEDURE — 81003 URINALYSIS AUTO W/O SCOPE: CPT

## 2023-07-19 PROCEDURE — 81015 MICROSCOPIC EXAM OF URINE: CPT

## 2023-07-19 PROCEDURE — 87086 URINE CULTURE/COLONY COUNT: CPT

## 2023-07-25 ENCOUNTER — HOSPITAL ENCOUNTER (OUTPATIENT)
Dept: HOSPITAL 101 - LAB | Age: 83
Discharge: HOME | End: 2023-07-25
Payer: MEDICARE

## 2023-07-25 ENCOUNTER — HOSPITAL ENCOUNTER (EMERGENCY)
Age: 83
Discharge: HOME | End: 2023-07-25
Payer: MEDICARE

## 2023-07-25 VITALS — HEART RATE: 66 BPM | RESPIRATION RATE: 14 BRPM

## 2023-07-25 VITALS — DIASTOLIC BLOOD PRESSURE: 95 MMHG | SYSTOLIC BLOOD PRESSURE: 169 MMHG

## 2023-07-25 VITALS — OXYGEN SATURATION: 97 %

## 2023-07-25 VITALS — OXYGEN SATURATION: 96 %

## 2023-07-25 VITALS — RESPIRATION RATE: 20 BRPM | HEART RATE: 66 BPM

## 2023-07-25 VITALS — HEART RATE: 58 BPM | OXYGEN SATURATION: 95 % | RESPIRATION RATE: 19 BRPM

## 2023-07-25 VITALS — HEART RATE: 60 BPM | RESPIRATION RATE: 10 BRPM | OXYGEN SATURATION: 93 %

## 2023-07-25 VITALS — DIASTOLIC BLOOD PRESSURE: 116 MMHG | SYSTOLIC BLOOD PRESSURE: 157 MMHG

## 2023-07-25 VITALS
SYSTOLIC BLOOD PRESSURE: 169 MMHG | OXYGEN SATURATION: 95 % | TEMPERATURE: 97.52 F | RESPIRATION RATE: 18 BRPM | HEART RATE: 66 BPM | DIASTOLIC BLOOD PRESSURE: 95 MMHG

## 2023-07-25 VITALS — OXYGEN SATURATION: 98 %

## 2023-07-25 VITALS — HEART RATE: 61 BPM | OXYGEN SATURATION: 98 % | RESPIRATION RATE: 19 BRPM

## 2023-07-25 VITALS — RESPIRATION RATE: 18 BRPM

## 2023-07-25 VITALS — HEART RATE: 61 BPM | RESPIRATION RATE: 15 BRPM | OXYGEN SATURATION: 96 %

## 2023-07-25 VITALS — OXYGEN SATURATION: 100 % | RESPIRATION RATE: 20 BRPM | HEART RATE: 56 BPM

## 2023-07-25 VITALS — RESPIRATION RATE: 19 BRPM | OXYGEN SATURATION: 100 % | HEART RATE: 55 BPM

## 2023-07-25 VITALS — OXYGEN SATURATION: 94 % | RESPIRATION RATE: 20 BRPM | HEART RATE: 59 BPM

## 2023-07-25 VITALS — HEART RATE: 62 BPM | OXYGEN SATURATION: 98 % | RESPIRATION RATE: 20 BRPM

## 2023-07-25 VITALS — SYSTOLIC BLOOD PRESSURE: 148 MMHG | DIASTOLIC BLOOD PRESSURE: 80 MMHG

## 2023-07-25 VITALS — HEART RATE: 64 BPM | OXYGEN SATURATION: 96 % | RESPIRATION RATE: 21 BRPM

## 2023-07-25 VITALS — HEART RATE: 53 BPM | RESPIRATION RATE: 19 BRPM | OXYGEN SATURATION: 95 %

## 2023-07-25 VITALS — OXYGEN SATURATION: 95 %

## 2023-07-25 VITALS — RESPIRATION RATE: 18 BRPM | HEART RATE: 64 BPM

## 2023-07-25 VITALS — HEART RATE: 56 BPM | RESPIRATION RATE: 19 BRPM | OXYGEN SATURATION: 97 %

## 2023-07-25 VITALS — OXYGEN SATURATION: 98 % | HEART RATE: 63 BPM | RESPIRATION RATE: 18 BRPM

## 2023-07-25 VITALS — OXYGEN SATURATION: 96 % | HEART RATE: 61 BPM | RESPIRATION RATE: 17 BRPM

## 2023-07-25 VITALS — RESPIRATION RATE: 26 BRPM | HEART RATE: 65 BPM

## 2023-07-25 VITALS — RESPIRATION RATE: 21 BRPM | HEART RATE: 66 BPM

## 2023-07-25 VITALS — RESPIRATION RATE: 21 BRPM | HEART RATE: 69 BPM

## 2023-07-25 VITALS — HEART RATE: 60 BPM | RESPIRATION RATE: 24 BRPM | OXYGEN SATURATION: 95 %

## 2023-07-25 VITALS — DIASTOLIC BLOOD PRESSURE: 80 MMHG | SYSTOLIC BLOOD PRESSURE: 148 MMHG

## 2023-07-25 VITALS — BODY MASS INDEX: 49.6 KG/M2

## 2023-07-25 DIAGNOSIS — Z79.84: ICD-10-CM

## 2023-07-25 DIAGNOSIS — N39.0: Primary | ICD-10-CM

## 2023-07-25 DIAGNOSIS — Z90.49: ICD-10-CM

## 2023-07-25 DIAGNOSIS — E66.01: ICD-10-CM

## 2023-07-25 DIAGNOSIS — Z79.890: ICD-10-CM

## 2023-07-25 DIAGNOSIS — Z79.01: ICD-10-CM

## 2023-07-25 DIAGNOSIS — Z79.899: ICD-10-CM

## 2023-07-25 DIAGNOSIS — Z79.4: ICD-10-CM

## 2023-07-25 DIAGNOSIS — K59.00: ICD-10-CM

## 2023-07-25 DIAGNOSIS — R10.9: ICD-10-CM

## 2023-07-25 DIAGNOSIS — M25.511: ICD-10-CM

## 2023-07-25 LAB
ADD MANUAL DIFF: NO
ALANINE AMINOTRANSFERASE: 21 U/L (ref 14–59)
ALBUMIN GLOBULIN RATIO: 0.6
ALBUMIN LEVEL: 3.1 G/DL (ref 3.4–5)
ALKALINE PHOSPHATASE: 77 U/L (ref 46–116)
ANION GAP: 15.4
ASPARTATE AMINO TRANSFERASE: 10 U/L (ref 15–37)
BLOOD UREA NITROGEN: 23 MG/DL (ref 7–18)
CALCIUM: 9 MG/DL (ref 8.5–10.1)
CARBON DIOXIDE: 29.6 MMOL/L (ref 21–32)
CAST SEEN?: (no result) #/LPF
CHLORIDE: 95 MMOL/L (ref 98–107)
CO2 BLD-SCNC: 29.6 MMOL/L (ref 21–32)
ESTIMATED GFR (AFRICAN AMERICA: 43 (ref 60–?)
ESTIMATED GFR (NON-AFRICAN AME: 35 (ref 60–?)
GLOBULIN: 5.2 G/DL
GLUCOSE BLD-MCNC: 134 MG/DL (ref 74–106)
GLUCOSE URINE UA: NEGATIVE MG/DL
HCT VFR BLD CALC: 40.1 % (ref 36–48)
HEMATOCRIT: 40.1 % (ref 36–48)
HEMOGLOBIN: 13.4 G/DL (ref 12–16)
IMMATURE GRANULOCYTES ABS AUTO: 0.06 10^3/UL (ref 0–0.03)
IMMATURE GRANULOCYTES PCT AUTO: 0.5 % (ref 0–0.5)
LACTATE/LACTIC ACID: 2.1 MMOL/L (ref 0.4–2)
LIPASE: 44 U/L (ref 73–393)
LYMPHOCYTES  ABSOLUTE AUTO: 1.8 10^3/UL (ref 1.2–3.8)
MCV RBC: 98.5 FL (ref 81–99)
MEAN CORPUSCULAR HEMOGLOBIN: 32.9 PG (ref 26.7–34)
MEAN CORPUSCULAR HGB CONC: 33.4 G/DL (ref 29.9–35.2)
MEAN CORPUSCULAR VOLUME: 98.5 FL (ref 81–99)
PLATELET # BLD: 376 10^3/UL (ref 150–450)
PLATELET COUNT: 376 10^3/UL (ref 150–450)
POTASSIUM SERPLBLD-SCNC: 4 MMOL/L (ref 3.5–5.1)
POTASSIUM: 4 MMOL/L (ref 3.5–5.1)
RED BLOOD COUNT: 4.07 10^6/UL (ref 4.2–5.4)
SODIUM BLD-SCNC: 136 MMOL/L (ref 136–145)
SODIUM: 136 MMOL/L (ref 136–145)
TOTAL PROTEIN: 8.3 G/DL (ref 6.4–8.2)
URINE CULTURE INDICATED: YES
WBC # BLD: 12.5 10^3/UL (ref 4–11)
WHITE BLOOD COUNT: 12.5 10^3/UL (ref 4–11)

## 2023-07-25 PROCEDURE — 87086 URINE CULTURE/COLONY COUNT: CPT

## 2023-07-25 PROCEDURE — 36415 COLL VENOUS BLD VENIPUNCTURE: CPT

## 2023-07-25 PROCEDURE — 81015 MICROSCOPIC EXAM OF URINE: CPT

## 2023-07-25 PROCEDURE — 85025 COMPLETE CBC W/AUTO DIFF WBC: CPT

## 2023-07-25 PROCEDURE — 81003 URINALYSIS AUTO W/O SCOPE: CPT

## 2023-07-25 PROCEDURE — 83605 ASSAY OF LACTIC ACID: CPT

## 2023-07-25 PROCEDURE — 73030 X-RAY EXAM OF SHOULDER: CPT

## 2023-07-25 PROCEDURE — 80053 COMPREHEN METABOLIC PANEL: CPT

## 2023-07-25 PROCEDURE — 99285 EMERGENCY DEPT VISIT HI MDM: CPT

## 2023-07-25 PROCEDURE — 84484 ASSAY OF TROPONIN QUANT: CPT

## 2023-07-25 PROCEDURE — 87186 SC STD MICRODIL/AGAR DIL: CPT

## 2023-07-25 PROCEDURE — 87150 DNA/RNA AMPLIFIED PROBE: CPT

## 2023-07-25 PROCEDURE — 83690 ASSAY OF LIPASE: CPT

## 2023-07-25 PROCEDURE — 93005 ELECTROCARDIOGRAM TRACING: CPT

## 2023-07-25 PROCEDURE — 74177 CT ABD & PELVIS W/CONTRAST: CPT

## 2023-08-07 ENCOUNTER — HOSPITAL ENCOUNTER (EMERGENCY)
Age: 83
Discharge: HOME | End: 2023-08-07
Payer: MEDICARE

## 2023-08-07 VITALS
HEART RATE: 69 BPM | RESPIRATION RATE: 16 BRPM | DIASTOLIC BLOOD PRESSURE: 77 MMHG | OXYGEN SATURATION: 99 % | SYSTOLIC BLOOD PRESSURE: 145 MMHG

## 2023-08-07 VITALS — BODY MASS INDEX: 57.6 KG/M2

## 2023-08-07 VITALS
HEART RATE: 66 BPM | SYSTOLIC BLOOD PRESSURE: 167 MMHG | RESPIRATION RATE: 16 BRPM | OXYGEN SATURATION: 96 % | TEMPERATURE: 97.88 F | DIASTOLIC BLOOD PRESSURE: 86 MMHG

## 2023-08-07 DIAGNOSIS — Z79.890: ICD-10-CM

## 2023-08-07 DIAGNOSIS — S22.069A: Primary | ICD-10-CM

## 2023-08-07 DIAGNOSIS — Z79.4: ICD-10-CM

## 2023-08-07 DIAGNOSIS — Z79.01: ICD-10-CM

## 2023-08-07 DIAGNOSIS — Z96.0: ICD-10-CM

## 2023-08-07 DIAGNOSIS — Z87.440: ICD-10-CM
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
## 2023-08-07 DIAGNOSIS — M54.50: ICD-10-CM

## 2023-08-07 DIAGNOSIS — Z79.899: ICD-10-CM

## 2023-08-07 PROCEDURE — 72131 CT LUMBAR SPINE W/O DYE: CPT

## 2023-08-07 PROCEDURE — 99285 EMERGENCY DEPT VISIT HI MDM: CPT

## 2023-08-07 PROCEDURE — 72128 CT CHEST SPINE W/O DYE: CPT

## 2023-08-07 PROCEDURE — 96374 THER/PROPH/DIAG INJ IV PUSH: CPT

## 2023-08-18 ENCOUNTER — HOSPITAL ENCOUNTER (EMERGENCY)
Age: 83
Discharge: HOME | End: 2023-08-18
Payer: MEDICARE

## 2023-08-18 VITALS
OXYGEN SATURATION: 97 % | SYSTOLIC BLOOD PRESSURE: 191 MMHG | RESPIRATION RATE: 20 BRPM | DIASTOLIC BLOOD PRESSURE: 83 MMHG | HEART RATE: 62 BPM

## 2023-08-18 VITALS
RESPIRATION RATE: 18 BRPM | SYSTOLIC BLOOD PRESSURE: 132 MMHG | HEART RATE: 61 BPM | DIASTOLIC BLOOD PRESSURE: 80 MMHG | OXYGEN SATURATION: 97 %

## 2023-08-18 VITALS
SYSTOLIC BLOOD PRESSURE: 188 MMHG | HEART RATE: 69 BPM | OXYGEN SATURATION: 95 % | RESPIRATION RATE: 18 BRPM | DIASTOLIC BLOOD PRESSURE: 89 MMHG

## 2023-08-18 VITALS
DIASTOLIC BLOOD PRESSURE: 73 MMHG | OXYGEN SATURATION: 93 % | RESPIRATION RATE: 18 BRPM | HEART RATE: 72 BPM | SYSTOLIC BLOOD PRESSURE: 156 MMHG

## 2023-08-18 VITALS — BODY MASS INDEX: 49.6 KG/M2

## 2023-08-18 DIAGNOSIS — Z90.710: ICD-10-CM

## 2023-08-18 DIAGNOSIS — Z79.84: ICD-10-CM

## 2023-08-18 DIAGNOSIS — Z96.0: ICD-10-CM

## 2023-08-18 DIAGNOSIS — Z79.4: ICD-10-CM

## 2023-08-18 DIAGNOSIS — E66.01: ICD-10-CM

## 2023-08-18 DIAGNOSIS — Z79.899: ICD-10-CM

## 2023-08-18 DIAGNOSIS — N39.0: Primary | ICD-10-CM

## 2023-08-18 DIAGNOSIS — N32.1: ICD-10-CM

## 2023-08-18 DIAGNOSIS — Z79.890: ICD-10-CM

## 2023-08-18 DIAGNOSIS — Z87.440: ICD-10-CM

## 2023-08-18 LAB
ADD MANUAL DIFF: NO
ALANINE AMINOTRANSFERASE: 19 U/L (ref 14–59)
ALBUMIN GLOBULIN RATIO: 0.6
ALBUMIN LEVEL: 3.1 G/DL (ref 3.4–5)
ALKALINE PHOSPHATASE: 80 U/L (ref 46–116)
ANION GAP: 11.3
ASPARTATE AMINO TRANSFERASE: 13 U/L (ref 15–37)
BLOOD UREA NITROGEN: 25 MG/DL (ref 7–18)
CALCIUM: 9.5 MG/DL (ref 8.5–10.1)
CARBON DIOXIDE: 29.9 MMOL/L (ref 21–32)
CAST SEEN?: (no result) #/LPF
CHLORIDE: 100 MMOL/L (ref 98–107)
CO2 BLD-SCNC: 29.9 MMOL/L (ref 21–32)
ESTIMATED GFR (AFRICAN AMERICA: 45 (ref 60–?)
ESTIMATED GFR (NON-AFRICAN AME: 37 (ref 60–?)
GLOBULIN: 4.9 G/DL
GLUCOSE BLD-MCNC: 103 MG/DL (ref 74–106)
GLUCOSE URINE UA: NEGATIVE MG/DL
HCT VFR BLD CALC: 39.5 % (ref 36–48)
HEMATOCRIT: 39.5 % (ref 36–48)
HEMOGLOBIN: 13 G/DL (ref 12–16)
IMMATURE GRANULOCYTES ABS AUTO: 0.05 10^3/UL (ref 0–0.03)
IMMATURE GRANULOCYTES PCT AUTO: 0.5 % (ref 0–0.5)
LYMPHOCYTES  ABSOLUTE AUTO: 1.4 10^3/UL (ref 1.2–3.8)
MCV RBC: 99 FL (ref 81–99)
MEAN CORPUSCULAR HEMOGLOBIN: 32.6 PG (ref 26.7–34)
MEAN CORPUSCULAR HGB CONC: 32.9 G/DL (ref 29.9–35.2)
MEAN CORPUSCULAR VOLUME: 99 FL (ref 81–99)
PLATELET # BLD: 334 10^3/UL (ref 150–450)
PLATELET COUNT: 334 10^3/UL (ref 150–450)
POTASSIUM SERPLBLD-SCNC: 4.2 MMOL/L (ref 3.5–5.1)
POTASSIUM: 4.2 MMOL/L (ref 3.5–5.1)
RED BLOOD COUNT: 3.99 10^6/UL (ref 4.2–5.4)
SODIUM BLD-SCNC: 137 MMOL/L (ref 136–145)
SODIUM: 137 MMOL/L (ref 136–145)
TOTAL PROTEIN: 8 G/DL (ref 6.4–8.2)
URINE CULTURE INDICATED: YES
WBC # BLD: 10.9 10^3/UL (ref 4–11)
WHITE BLOOD COUNT: 10.9 10^3/UL (ref 4–11)

## 2023-08-18 PROCEDURE — 87186 SC STD MICRODIL/AGAR DIL: CPT

## 2023-08-18 PROCEDURE — 96374 THER/PROPH/DIAG INJ IV PUSH: CPT

## 2023-08-18 PROCEDURE — 80053 COMPREHEN METABOLIC PANEL: CPT

## 2023-08-18 PROCEDURE — 36415 COLL VENOUS BLD VENIPUNCTURE: CPT

## 2023-08-18 PROCEDURE — 87086 URINE CULTURE/COLONY COUNT: CPT

## 2023-08-18 PROCEDURE — 99285 EMERGENCY DEPT VISIT HI MDM: CPT

## 2023-08-18 PROCEDURE — 81001 URINALYSIS AUTO W/SCOPE: CPT

## 2023-08-18 PROCEDURE — 74177 CT ABD & PELVIS W/CONTRAST: CPT

## 2023-08-18 PROCEDURE — 85025 COMPLETE CBC W/AUTO DIFF WBC: CPT

## 2023-08-18 PROCEDURE — 87150 DNA/RNA AMPLIFIED PROBE: CPT

## 2023-09-18 ENCOUNTER — HOSPITAL ENCOUNTER
Age: 83
Discharge: HOME | End: 2023-09-18
Payer: MEDICARE

## 2023-09-18 DIAGNOSIS — M47.816: ICD-10-CM

## 2023-09-18 DIAGNOSIS — M47.814: Primary | ICD-10-CM

## 2023-09-18 PROCEDURE — G0463 HOSPITAL OUTPT CLINIC VISIT: HCPCS

## 2023-10-08 ENCOUNTER — HOSPITAL ENCOUNTER (INPATIENT)
Dept: HOSPITAL 101 - ER | Age: 83
LOS: 3 days | Discharge: HOME HEALTH | DRG: 699 | End: 2023-10-11
Payer: MEDICARE

## 2023-10-08 VITALS
RESPIRATION RATE: 14 BRPM | DIASTOLIC BLOOD PRESSURE: 55 MMHG | OXYGEN SATURATION: 89 % | TEMPERATURE: 99.86 F | HEART RATE: 82 BPM | SYSTOLIC BLOOD PRESSURE: 121 MMHG

## 2023-10-08 VITALS — BODY MASS INDEX: 48.5 KG/M2

## 2023-10-08 DIAGNOSIS — Z96.0: ICD-10-CM

## 2023-10-08 DIAGNOSIS — Z16.19: ICD-10-CM

## 2023-10-08 DIAGNOSIS — E03.9: ICD-10-CM

## 2023-10-08 DIAGNOSIS — N39.0: ICD-10-CM

## 2023-10-08 DIAGNOSIS — N17.9: ICD-10-CM

## 2023-10-08 DIAGNOSIS — E11.22: ICD-10-CM
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
## 2023-10-08 DIAGNOSIS — N18.32: ICD-10-CM

## 2023-10-08 DIAGNOSIS — K21.9: ICD-10-CM

## 2023-10-08 DIAGNOSIS — Z80.9: ICD-10-CM

## 2023-10-08 DIAGNOSIS — R78.81: ICD-10-CM

## 2023-10-08 DIAGNOSIS — Z16.11: ICD-10-CM

## 2023-10-08 DIAGNOSIS — R41.82: ICD-10-CM

## 2023-10-08 DIAGNOSIS — Z90.49: ICD-10-CM

## 2023-10-08 DIAGNOSIS — I48.0: ICD-10-CM

## 2023-10-08 DIAGNOSIS — I89.0: ICD-10-CM

## 2023-10-08 DIAGNOSIS — M79.7: ICD-10-CM

## 2023-10-08 DIAGNOSIS — Z79.4: ICD-10-CM

## 2023-10-08 DIAGNOSIS — G25.81: ICD-10-CM

## 2023-10-08 DIAGNOSIS — Z96.659: ICD-10-CM

## 2023-10-08 DIAGNOSIS — Z82.3: ICD-10-CM

## 2023-10-08 DIAGNOSIS — Z90.710: ICD-10-CM

## 2023-10-08 DIAGNOSIS — Z79.01: ICD-10-CM

## 2023-10-08 DIAGNOSIS — Z91.81: ICD-10-CM

## 2023-10-08 DIAGNOSIS — E66.01: ICD-10-CM

## 2023-10-08 DIAGNOSIS — Z79.890: ICD-10-CM

## 2023-10-08 DIAGNOSIS — Z83.3: ICD-10-CM

## 2023-10-08 DIAGNOSIS — E11.65: ICD-10-CM

## 2023-10-08 DIAGNOSIS — Z79.899: ICD-10-CM

## 2023-10-08 DIAGNOSIS — Z79.84: ICD-10-CM

## 2023-10-08 DIAGNOSIS — T83.511A: Primary | ICD-10-CM

## 2023-10-08 LAB — ADD MANUAL DIFF: NO

## 2023-10-08 PROCEDURE — 99285 EMERGENCY DEPT VISIT HI MDM: CPT

## 2023-10-08 PROCEDURE — 90662 IIV NO PRSV INCREASED AG IM: CPT

## 2023-10-08 PROCEDURE — 83880 ASSAY OF NATRIURETIC PEPTIDE: CPT

## 2023-10-08 PROCEDURE — 87186 SC STD MICRODIL/AGAR DIL: CPT

## 2023-10-08 PROCEDURE — 96365 THER/PROPH/DIAG IV INF INIT: CPT

## 2023-10-08 PROCEDURE — 36415 COLL VENOUS BLD VENIPUNCTURE: CPT

## 2023-10-08 PROCEDURE — 87040 BLOOD CULTURE FOR BACTERIA: CPT

## 2023-10-08 PROCEDURE — 96367 TX/PROPH/DG ADDL SEQ IV INF: CPT

## 2023-10-08 PROCEDURE — G0008 ADMIN INFLUENZA VIRUS VAC: HCPCS

## 2023-10-08 PROCEDURE — 85025 COMPLETE CBC W/AUTO DIFF WBC: CPT

## 2023-10-08 PROCEDURE — 80048 BASIC METABOLIC PNL TOTAL CA: CPT

## 2023-10-08 PROCEDURE — 97165 OT EVAL LOW COMPLEX 30 MIN: CPT

## 2023-10-08 PROCEDURE — 96375 TX/PRO/DX INJ NEW DRUG ADDON: CPT

## 2023-10-08 PROCEDURE — 82948 REAGENT STRIP/BLOOD GLUCOSE: CPT

## 2023-10-08 PROCEDURE — 87086 URINE CULTURE/COLONY COUNT: CPT

## 2023-10-08 PROCEDURE — 83605 ASSAY OF LACTIC ACID: CPT

## 2023-10-08 PROCEDURE — 96376 TX/PRO/DX INJ SAME DRUG ADON: CPT

## 2023-10-08 PROCEDURE — 81001 URINALYSIS AUTO W/SCOPE: CPT

## 2023-10-08 PROCEDURE — 87150 DNA/RNA AMPLIFIED PROBE: CPT

## 2023-10-08 PROCEDURE — 87811 SARS-COV-2 COVID19 W/OPTIC: CPT

## 2023-10-08 PROCEDURE — 71045 X-RAY EXAM CHEST 1 VIEW: CPT

## 2023-10-08 PROCEDURE — U0003 INFECTIOUS AGENT DETECTION BY NUCLEIC ACID (DNA OR RNA); SEVERE ACUTE RESPIRATORY SYNDROME CORONAVIRUS 2 (SARS-COV-2) (CORONAVIRUS DISEASE [COVID-19]), AMPLIFIED PROBE TECHNIQUE, MAKING USE OF HIGH THROUGHPUT TECHNOLOGIES AS DESCRIBED BY CMS-2020-01-R: HCPCS

## 2023-10-08 PROCEDURE — 51702 INSERT TEMP BLADDER CATH: CPT

## 2023-10-08 PROCEDURE — 97110 THERAPEUTIC EXERCISES: CPT

## 2023-10-08 PROCEDURE — 96366 THER/PROPH/DIAG IV INF ADDON: CPT

## 2023-10-08 PROCEDURE — 74176 CT ABD & PELVIS W/O CONTRAST: CPT

## 2023-10-08 PROCEDURE — 51798 US URINE CAPACITY MEASURE: CPT

## 2023-10-08 PROCEDURE — 87635 SARS-COV-2 COVID-19 AMP PRB: CPT

## 2023-10-09 VITALS — HEART RATE: 82 BPM | RESPIRATION RATE: 18 BRPM | OXYGEN SATURATION: 94 %

## 2023-10-09 VITALS — DIASTOLIC BLOOD PRESSURE: 48 MMHG | SYSTOLIC BLOOD PRESSURE: 127 MMHG

## 2023-10-09 VITALS — RESPIRATION RATE: 18 BRPM

## 2023-10-09 VITALS
TEMPERATURE: 100.3 F | DIASTOLIC BLOOD PRESSURE: 69 MMHG | SYSTOLIC BLOOD PRESSURE: 107 MMHG | HEART RATE: 69 BPM | RESPIRATION RATE: 18 BRPM | OXYGEN SATURATION: 94 %

## 2023-10-09 VITALS
HEART RATE: 63 BPM | SYSTOLIC BLOOD PRESSURE: 106 MMHG | DIASTOLIC BLOOD PRESSURE: 56 MMHG | RESPIRATION RATE: 20 BRPM | OXYGEN SATURATION: 94 % | TEMPERATURE: 98.5 F

## 2023-10-09 VITALS
RESPIRATION RATE: 18 BRPM | SYSTOLIC BLOOD PRESSURE: 107 MMHG | OXYGEN SATURATION: 94 % | DIASTOLIC BLOOD PRESSURE: 69 MMHG | TEMPERATURE: 100.22 F | HEART RATE: 69 BPM

## 2023-10-09 VITALS
TEMPERATURE: 98.2 F | RESPIRATION RATE: 18 BRPM | OXYGEN SATURATION: 94 % | HEART RATE: 67 BPM | SYSTOLIC BLOOD PRESSURE: 97 MMHG | DIASTOLIC BLOOD PRESSURE: 64 MMHG

## 2023-10-09 VITALS
DIASTOLIC BLOOD PRESSURE: 50 MMHG | OXYGEN SATURATION: 95 % | HEART RATE: 65 BPM | SYSTOLIC BLOOD PRESSURE: 106 MMHG | RESPIRATION RATE: 12 BRPM

## 2023-10-09 VITALS
SYSTOLIC BLOOD PRESSURE: 108 MMHG | HEART RATE: 75 BPM | RESPIRATION RATE: 18 BRPM | DIASTOLIC BLOOD PRESSURE: 85 MMHG | OXYGEN SATURATION: 95 % | TEMPERATURE: 97.88 F

## 2023-10-09 VITALS — RESPIRATION RATE: 12 BRPM | OXYGEN SATURATION: 97 % | HEART RATE: 65 BPM

## 2023-10-09 VITALS — OXYGEN SATURATION: 94 %

## 2023-10-09 VITALS — OXYGEN SATURATION: 93 %

## 2023-10-09 VITALS — OXYGEN SATURATION: 97 %

## 2023-10-09 LAB
A. CALCOACETICUS-BAUMANNII CPX: NOT DETECTED
ADD MANUAL DIFF: NO
ANION GAP: 10.5
ANION GAP: 11.1
BACTEROIDES FRAGILIS: NOT DETECTED
BLOOD UREA NITROGEN: 41 MG/DL (ref 7–18)
BLOOD UREA NITROGEN: 41 MG/DL (ref 7–18)
CALCIUM: 8.9 MG/DL (ref 8.5–10.1)
CALCIUM: 9.3 MG/DL (ref 8.5–10.1)
CANDIDA AURIS: NOT DETECTED
CANDIDA GLABRATA: NOT DETECTED
CARBON DIOXIDE: 27.5 MMOL/L (ref 21–32)
CARBON DIOXIDE: 28.3 MMOL/L (ref 21–32)
CAST SEEN?: (no result) #/LPF
CHLORIDE: 100 MMOL/L (ref 98–107)
CHLORIDE: 98 MMOL/L (ref 98–107)
CO2 BLD-SCNC: 27.5 MMOL/L (ref 21–32)
CO2 BLD-SCNC: 28.3 MMOL/L (ref 21–32)
ENTEROBACTERALES: DETECTED
ENTEROCOCCUS FAECALIS: NOT DETECTED
ENTEROCOCCUS FAECIUM: NOT DETECTED
ESTIMATED GFR (AFRICAN AMERICA: 24 (ref 60–?)
ESTIMATED GFR (AFRICAN AMERICA: 25 (ref 60–?)
ESTIMATED GFR (NON-AFRICAN AME: 20 (ref 60–?)
ESTIMATED GFR (NON-AFRICAN AME: 21 (ref 60–?)
GLUCOSE BLD-MCNC: 184 MG/DL (ref 74–106)
GLUCOSE BLD-MCNC: 201 MG/DL (ref 74–106)
GLUCOSE URINE UA: NEGATIVE MG/DL
HCT VFR BLD CALC: 36.9 % (ref 36–48)
HCT VFR BLD CALC: 37.7 % (ref 36–48)
HEMATOCRIT: 36.9 % (ref 36–48)
HEMATOCRIT: 37.7 % (ref 36–48)
HEMOGLOBIN: 12.3 G/DL (ref 12–16)
HEMOGLOBIN: 12.6 G/DL (ref 12–16)
IMMATURE GRANULOCYTES ABS AUTO: 0.14 10^3/UL (ref 0–0.03)
IMMATURE GRANULOCYTES ABS AUTO: 0.27 10^3/UL (ref 0–0.03)
IMMATURE GRANULOCYTES PCT AUTO: 0.6 % (ref 0–0.5)
IMMATURE GRANULOCYTES PCT AUTO: 1 % (ref 0–0.5)
KLEBSIELLA AEROGENES: NOT DETECTED
KLEBSIELLA PNEUMONIAE GROUP: NOT DETECTED
LACTATE/LACTIC ACID: 1 MMOL/L (ref 0.4–2)
LACTATE/LACTIC ACID: 1 MMOL/L (ref 0.4–2)
LYMPHOCYTES  ABSOLUTE AUTO: 1.1 10^3/UL (ref 1.2–3.8)
LYMPHOCYTES  ABSOLUTE AUTO: 1.2 10^3/UL (ref 1.2–3.8)
MCV RBC: 98.2 FL (ref 81–99)
MCV RBC: 98.7 FL (ref 81–99)
MEAN CORPUSCULAR HEMOGLOBIN: 32.8 PG (ref 26.7–34)
MEAN CORPUSCULAR HEMOGLOBIN: 32.9 PG (ref 26.7–34)
MEAN CORPUSCULAR HGB CONC: 33.3 G/DL (ref 29.9–35.2)
MEAN CORPUSCULAR HGB CONC: 33.4 G/DL (ref 29.9–35.2)
MEAN CORPUSCULAR VOLUME: 98.2 FL (ref 81–99)
MEAN CORPUSCULAR VOLUME: 98.7 FL (ref 81–99)
MECA/C AND MREJ (MRSA): (no result)
MECA/C: (no result)
NT PRO B TYPE NATRIURETIC PEPT: 859 PG/ML (ref ?–1800)
PLATELET # BLD: 374 10^3/UL (ref 150–450)
PLATELET # BLD: 399 10^3/UL (ref 150–450)
PLATELET COUNT: 374 10^3/UL (ref 150–450)
PLATELET COUNT: 399 10^3/UL (ref 150–450)
POTASSIUM SERPLBLD-SCNC: 5 MMOL/L (ref 3.5–5.1)
POTASSIUM SERPLBLD-SCNC: 5.4 MMOL/L (ref 3.5–5.1)
POTASSIUM: 5 MMOL/L (ref 3.5–5.1)
POTASSIUM: 5.4 MMOL/L (ref 3.5–5.1)
PROTEUS SPP.: NOT DETECTED
RED BLOOD COUNT: 3.74 10^6/UL (ref 4.2–5.4)
RED BLOOD COUNT: 3.84 10^6/UL (ref 4.2–5.4)
SALMONELLA SPP.: NOT DETECTED
SARS-COV-2 AG: NEGATIVE
SARS-COV-2 NAA: NOT DETECTED
SERRATIA MARCESCENS: NOT DETECTED
SODIUM BLD-SCNC: 132 MMOL/L (ref 136–145)
SODIUM BLD-SCNC: 133 MMOL/L (ref 136–145)
SODIUM: 132 MMOL/L (ref 136–145)
SODIUM: 133 MMOL/L (ref 136–145)
STAPHYLOCOCCUS EPIDERMIDIS: NOT DETECTED
STAPHYLOCOCCUS LUGDUNENSIS: NOT DETECTED
STAPHYLOCOCCUS SPP.: NOT DETECTED
STENOTROPHOMONAS MALTOPHILIA: NOT DETECTED
STREPTOCOCCUS PYOGENES: NOT DETECTED
STREPTOCOCCUS SPP.: NOT DETECTED
URINE CULTURE INDICATED: YES
VANA/B: (no result)
WBC # BLD: 24.7 10^3/UL (ref 4–11)
WBC # BLD: 26.4 10^3/UL (ref 4–11)
WHITE BLOOD COUNT: 24.7 10^3/UL (ref 4–11)
WHITE BLOOD COUNT: 26.4 10^3/UL (ref 4–11)

## 2023-10-09 RX ADMIN — AMIODARONE HYDROCHLORIDE 200 MG: 200 TABLET ORAL at 09:27

## 2023-10-09 RX ADMIN — SODIUM CHLORIDE 100 ML: 900 INJECTION, SOLUTION INTRAVENOUS at 06:42

## 2023-10-09 RX ADMIN — RIVAROXABAN 15 MG: 10 TABLET, FILM COATED ORAL at 16:45

## 2023-10-09 RX ADMIN — DULOXETINE 20 MG: 20 CAPSULE, DELAYED RELEASE ORAL at 09:27

## 2023-10-09 RX ADMIN — LEVOFLOXACIN 750 MG: 750 INJECTION, SOLUTION INTRAVENOUS at 11:51

## 2023-10-09 RX ADMIN — INSULIN ASPART 0 UNIT: 100 INJECTION, SOLUTION INTRAVENOUS; SUBCUTANEOUS at 11:58

## 2023-10-09 RX ADMIN — POTASSIUM CHLORIDE 10 MEQ: 750 TABLET, EXTENDED RELEASE ORAL at 09:26

## 2023-10-09 RX ADMIN — GABAPENTIN 100 MG: 100 CAPSULE ORAL at 09:27

## 2023-10-09 RX ADMIN — LEVOTHYROXINE SODIUM 125 MCG: 0.12 TABLET ORAL at 09:27

## 2023-10-09 RX ADMIN — SODIUM CHLORIDE 500 ML: 9 INJECTION, SOLUTION INTRAVENOUS at 01:26

## 2023-10-09 RX ADMIN — SPIRONOLACTONE 25 MG: 25 TABLET, FILM COATED ORAL at 09:27

## 2023-10-09 RX ADMIN — SODIUM CHLORIDE 100 ML: 900 INJECTION, SOLUTION INTRAVENOUS at 16:41

## 2023-10-10 VITALS
HEART RATE: 56 BPM | DIASTOLIC BLOOD PRESSURE: 60 MMHG | TEMPERATURE: 98.2 F | OXYGEN SATURATION: 20 % | RESPIRATION RATE: 20 BRPM | SYSTOLIC BLOOD PRESSURE: 104 MMHG

## 2023-10-10 VITALS
HEART RATE: 61 BPM | SYSTOLIC BLOOD PRESSURE: 113 MMHG | TEMPERATURE: 98.3 F | DIASTOLIC BLOOD PRESSURE: 54 MMHG | OXYGEN SATURATION: 93 % | RESPIRATION RATE: 18 BRPM

## 2023-10-10 VITALS
DIASTOLIC BLOOD PRESSURE: 52 MMHG | HEART RATE: 59 BPM | OXYGEN SATURATION: 92 % | SYSTOLIC BLOOD PRESSURE: 158 MMHG | TEMPERATURE: 97.88 F | RESPIRATION RATE: 18 BRPM

## 2023-10-10 VITALS — SYSTOLIC BLOOD PRESSURE: 166 MMHG | DIASTOLIC BLOOD PRESSURE: 60 MMHG | HEART RATE: 60 BPM

## 2023-10-10 VITALS — OXYGEN SATURATION: 94 %

## 2023-10-10 VITALS — DIASTOLIC BLOOD PRESSURE: 60 MMHG | SYSTOLIC BLOOD PRESSURE: 166 MMHG

## 2023-10-10 VITALS — OXYGEN SATURATION: 92 %

## 2023-10-10 VITALS — OXYGEN SATURATION: 93 %

## 2023-10-10 LAB
ADD MANUAL DIFF: NO
ANION GAP: 10.6
BLOOD UREA NITROGEN: 35 MG/DL (ref 7–18)
CALCIUM: 8.5 MG/DL (ref 8.5–10.1)
CARBON DIOXIDE: 25.1 MMOL/L (ref 21–32)
CHLORIDE: 101 MMOL/L (ref 98–107)
CO2 BLD-SCNC: 25.1 MMOL/L (ref 21–32)
ESTIMATED GFR (AFRICAN AMERICA: 37 (ref 60–?)
ESTIMATED GFR (NON-AFRICAN AME: 31 (ref 60–?)
GLUCOSE BLD-MCNC: 127 MG/DL (ref 74–106)
HCT VFR BLD CALC: 32.5 % (ref 36–48)
HEMATOCRIT: 32.5 % (ref 36–48)
HEMOGLOBIN: 10.4 G/DL (ref 12–16)
IMMATURE GRANULOCYTES ABS AUTO: 0.18 10^3/UL (ref 0–0.03)
IMMATURE GRANULOCYTES PCT AUTO: 1.1 % (ref 0–0.5)
LYMPHOCYTES  ABSOLUTE AUTO: 1.4 10^3/UL (ref 1.2–3.8)
MCV RBC: 100.6 FL (ref 81–99)
MEAN CORPUSCULAR HEMOGLOBIN: 32.2 PG (ref 26.7–34)
MEAN CORPUSCULAR HGB CONC: 32 G/DL (ref 29.9–35.2)
MEAN CORPUSCULAR VOLUME: 100.6 FL (ref 81–99)
PLATELET # BLD: 313 10^3/UL (ref 150–450)
PLATELET COUNT: 313 10^3/UL (ref 150–450)
POTASSIUM SERPLBLD-SCNC: 4.7 MMOL/L (ref 3.5–5.1)
POTASSIUM: 4.7 MMOL/L (ref 3.5–5.1)
RED BLOOD COUNT: 3.23 10^6/UL (ref 4.2–5.4)
SODIUM BLD-SCNC: 132 MMOL/L (ref 136–145)
SODIUM: 132 MMOL/L (ref 136–145)
WBC # BLD: 16 10^3/UL (ref 4–11)
WHITE BLOOD COUNT: 16 10^3/UL (ref 4–11)

## 2023-10-10 RX ADMIN — TRAMADOL HYDROCHLORIDE 50 MG: 50 TABLET, COATED ORAL at 09:02

## 2023-10-10 RX ADMIN — TRAMADOL HYDROCHLORIDE 50 MG: 50 TABLET, COATED ORAL at 19:36

## 2023-10-10 RX ADMIN — POTASSIUM CHLORIDE 10 MEQ: 750 TABLET, EXTENDED RELEASE ORAL at 09:02

## 2023-10-10 RX ADMIN — LEVOTHYROXINE SODIUM 125 MCG: 0.12 TABLET ORAL at 07:43

## 2023-10-10 RX ADMIN — SODIUM CHLORIDE 100 ML: 900 INJECTION, SOLUTION INTRAVENOUS at 02:39

## 2023-10-10 RX ADMIN — SODIUM CHLORIDE 100 ML: 900 INJECTION, SOLUTION INTRAVENOUS at 13:34

## 2023-10-10 RX ADMIN — AMIODARONE HYDROCHLORIDE 200 MG: 200 TABLET ORAL at 08:59

## 2023-10-10 RX ADMIN — RIVAROXABAN 15 MG: 10 TABLET, FILM COATED ORAL at 17:29

## 2023-10-10 RX ADMIN — DULOXETINE 20 MG: 20 CAPSULE, DELAYED RELEASE ORAL at 09:02

## 2023-10-10 RX ADMIN — SPIRONOLACTONE 25 MG: 25 TABLET, FILM COATED ORAL at 09:02

## 2023-10-10 RX ADMIN — SODIUM CHLORIDE 100 ML: 900 INJECTION, SOLUTION INTRAVENOUS at 23:34

## 2023-10-10 RX ADMIN — GABAPENTIN 100 MG: 100 CAPSULE ORAL at 09:02

## 2023-10-11 VITALS
RESPIRATION RATE: 18 BRPM | HEART RATE: 65 BPM | SYSTOLIC BLOOD PRESSURE: 158 MMHG | OXYGEN SATURATION: 93 % | DIASTOLIC BLOOD PRESSURE: 48 MMHG

## 2023-10-11 VITALS
SYSTOLIC BLOOD PRESSURE: 123 MMHG | OXYGEN SATURATION: 96 % | HEART RATE: 56 BPM | TEMPERATURE: 97.5 F | DIASTOLIC BLOOD PRESSURE: 60 MMHG | RESPIRATION RATE: 20 BRPM

## 2023-10-11 VITALS — OXYGEN SATURATION: 91 %

## 2023-10-11 LAB
ADD MANUAL DIFF: NO
ANION GAP: 8.6
BLOOD UREA NITROGEN: 24 MG/DL (ref 7–18)
CALCIUM: 8.1 MG/DL (ref 8.5–10.1)
CARBON DIOXIDE: 25.2 MMOL/L (ref 21–32)
CHLORIDE: 104 MMOL/L (ref 98–107)
CO2 BLD-SCNC: 25.2 MMOL/L (ref 21–32)
CTX-M: NOT DETECTED
ESTIMATED GFR (AFRICAN AMERICA: 54 (ref 60–?)
ESTIMATED GFR (NON-AFRICAN AME: 45 (ref 60–?)
GLUCOSE BLD-MCNC: 137 MG/DL (ref 74–106)
HCT VFR BLD CALC: 32.1 % (ref 36–48)
HEMATOCRIT: 32.1 % (ref 36–48)
HEMOGLOBIN: 10.2 G/DL (ref 12–16)
IMMATURE GRANULOCYTES ABS AUTO: 0.2 10^3/UL (ref 0–0.03)
IMMATURE GRANULOCYTES PCT AUTO: 2.2 % (ref 0–0.5)
IMP: NOT DETECTED
KPC: NOT DETECTED
LYMPHOCYTES  ABSOLUTE AUTO: 1.5 10^3/UL (ref 1.2–3.8)
MCR-1: NOT DETECTED
MCV RBC: 100 FL (ref 81–99)
MEAN CORPUSCULAR HEMOGLOBIN: 31.8 PG (ref 26.7–34)
MEAN CORPUSCULAR HGB CONC: 31.8 G/DL (ref 29.9–35.2)
MEAN CORPUSCULAR VOLUME: 100 FL (ref 81–99)
NDM: NOT DETECTED
OXA-48-LIKE: NOT DETECTED
PLATELET # BLD: 313 10^3/UL (ref 150–450)
PLATELET COUNT: 313 10^3/UL (ref 150–450)
POTASSIUM SERPLBLD-SCNC: 3.8 MMOL/L (ref 3.5–5.1)
POTASSIUM: 3.8 MMOL/L (ref 3.5–5.1)
RED BLOOD COUNT: 3.21 10^6/UL (ref 4.2–5.4)
SODIUM BLD-SCNC: 134 MMOL/L (ref 136–145)
SODIUM: 134 MMOL/L (ref 136–145)
VIM: NOT DETECTED
WBC # BLD: 9.3 10^3/UL (ref 4–11)
WHITE BLOOD COUNT: 9.3 10^3/UL (ref 4–11)

## 2023-10-11 RX ADMIN — DULOXETINE 20 MG: 20 CAPSULE, DELAYED RELEASE ORAL at 09:01

## 2023-10-11 RX ADMIN — ACETAMINOPHEN 650 MG: 325 TABLET ORAL at 12:46

## 2023-10-11 RX ADMIN — POTASSIUM CHLORIDE 10 MEQ: 750 TABLET, EXTENDED RELEASE ORAL at 09:02

## 2023-10-11 RX ADMIN — LEVOFLOXACIN 100 MG: 5 INJECTION, SOLUTION INTRAVENOUS at 11:36

## 2023-10-11 RX ADMIN — AMIODARONE HYDROCHLORIDE 200 MG: 200 TABLET ORAL at 09:02

## 2023-10-11 RX ADMIN — GABAPENTIN 100 MG: 100 CAPSULE ORAL at 09:02

## 2023-10-11 RX ADMIN — LEVOTHYROXINE SODIUM 125 MCG: 0.12 TABLET ORAL at 06:10

## 2023-10-11 RX ADMIN — SPIRONOLACTONE 25 MG: 25 TABLET, FILM COATED ORAL at 09:02

## 2023-10-11 RX ADMIN — SODIUM CHLORIDE 100 ML: 900 INJECTION, SOLUTION INTRAVENOUS at 09:01

## 2023-12-14 ENCOUNTER — HOSPITAL ENCOUNTER
Dept: HOSPITAL 101 - PM | Age: 83
Discharge: HOME | End: 2023-12-14
Payer: MEDICARE

## 2023-12-14 DIAGNOSIS — M47.814: Primary | ICD-10-CM

## 2023-12-14 DIAGNOSIS — Z79.891: ICD-10-CM

## 2023-12-14 DIAGNOSIS — M47.816: ICD-10-CM

## 2023-12-14 PROCEDURE — G0463 HOSPITAL OUTPT CLINIC VISIT: HCPCS

## 2024-02-01 ENCOUNTER — HOSPITAL ENCOUNTER (OUTPATIENT)
Dept: HOSPITAL 101 - LAB | Age: 84
Discharge: HOME | End: 2024-02-01
Payer: MEDICARE

## 2024-02-01 DIAGNOSIS — I48.91: ICD-10-CM

## 2024-02-01 DIAGNOSIS — N19: ICD-10-CM

## 2024-02-01 DIAGNOSIS — N39.0: ICD-10-CM

## 2024-02-01 DIAGNOSIS — E03.9: Primary | ICD-10-CM

## 2024-02-01 LAB
ADD MANUAL DIFF: NO
ALANINE AMINOTRANSFERASE: 21 U/L (ref 14–59)
ALBUMIN GLOBULIN RATIO: 0.6
ALBUMIN LEVEL: 3.3 G/DL (ref 3.4–5)
ALKALINE PHOSPHATASE: 80 U/L (ref 46–116)
ANION GAP: 13
ASPARTATE AMINO TRANSFERASE: 15 U/L (ref 15–37)
BLOOD UREA NITROGEN: 31 MG/DL (ref 7–18)
CALCIUM: 9.6 MG/DL (ref 8.5–10.1)
CARBON DIOXIDE: 28.4 MMOL/L (ref 21–32)
CHLORIDE: 100 MMOL/L (ref 98–107)
CHOLESTEROL: 181 MG/DL (ref ?–200)
CO2 BLD-SCNC: 28.4 MMOL/L (ref 21–32)
ESTIMATED GFR (AFRICAN AMERICA: 37 (ref 60–?)
ESTIMATED GFR (NON-AFRICAN AME: 31 (ref 60–?)
FREE T3: 1.11 PG/ML (ref 2.18–3.98)
GLOBULIN: 5.2 G/DL
GLUCOSE BLD-MCNC: 187 MG/DL (ref 74–106)
HCT VFR BLD CALC: 41.6 % (ref 36–48)
HDL CHOLESTEROL: 39 MG/DL (ref 40–60)
HEMATOCRIT: 41.6 % (ref 36–48)
HEMOGLOBIN: 13.5 G/DL (ref 12–16)
IMMATURE GRANULOCYTES ABS AUTO: 0.03 10^3/UL (ref 0–0.03)
IMMATURE GRANULOCYTES PCT AUTO: 0.4 % (ref 0–0.5)
LYMPHOCYTES  ABSOLUTE AUTO: 1.6 10^3/UL (ref 1.2–3.8)
MCV RBC: 101 FL (ref 81–99)
MEAN CORPUSCULAR HEMOGLOBIN: 32.8 PG (ref 26.7–34)
MEAN CORPUSCULAR HGB CONC: 32.5 G/DL (ref 29.9–35.2)
MEAN CORPUSCULAR VOLUME: 101 FL (ref 81–99)
PLATELET # BLD: 315 10^3/UL (ref 150–450)
PLATELET COUNT: 315 10^3/UL (ref 150–450)
POTASSIUM SERPLBLD-SCNC: 4.4 MMOL/L (ref 3.5–5.1)
POTASSIUM: 4.4 MMOL/L (ref 3.5–5.1)
PROTEIN CREATININE RATIO URINE: 2.89
RED BLOOD COUNT: 4.12 10^6/UL (ref 4.2–5.4)
SODIUM BLD-SCNC: 137 MMOL/L (ref 136–145)
SODIUM: 137 MMOL/L (ref 136–145)
THYROID STIMULATING HORMONE: 1.01 UIU/ML (ref 0.36–3.74)
TOTAL PROTEIN URINE RANDOM: 337.4 MG/DL (ref ?–11.9)
TOTAL PROTEIN: 8.5 G/DL (ref 6.4–8.2)
TRIGLYCERIDES: 228 MG/DL (ref ?–150)
VLDL CHOLESTEROL: 45.6 MG/DL
WBC # BLD: 8.2 10^3/UL (ref 4–11)
WHITE BLOOD COUNT: 8.2 10^3/UL (ref 4–11)

## 2024-02-01 PROCEDURE — 80053 COMPREHEN METABOLIC PANEL: CPT

## 2024-02-01 PROCEDURE — 84443 ASSAY THYROID STIM HORMONE: CPT

## 2024-02-01 PROCEDURE — 85025 COMPLETE CBC W/AUTO DIFF WBC: CPT

## 2024-02-01 PROCEDURE — 36415 COLL VENOUS BLD VENIPUNCTURE: CPT

## 2024-02-01 PROCEDURE — 87150 DNA/RNA AMPLIFIED PROBE: CPT

## 2024-02-01 PROCEDURE — 80061 LIPID PANEL: CPT

## 2024-02-01 PROCEDURE — 82570 ASSAY OF URINE CREATININE: CPT

## 2024-02-01 PROCEDURE — 84481 FREE ASSAY (FT-3): CPT

## 2024-02-01 PROCEDURE — 84156 ASSAY OF PROTEIN URINE: CPT

## 2024-02-01 PROCEDURE — 87086 URINE CULTURE/COLONY COUNT: CPT

## 2024-02-01 PROCEDURE — 84439 ASSAY OF FREE THYROXINE: CPT

## 2024-02-01 PROCEDURE — 83036 HEMOGLOBIN GLYCOSYLATED A1C: CPT

## 2024-02-01 PROCEDURE — 87186 SC STD MICRODIL/AGAR DIL: CPT

## 2024-02-25 ENCOUNTER — HOSPITAL ENCOUNTER (OUTPATIENT)
Dept: HOSPITAL 101 - LAB | Age: 84
Discharge: HOME | End: 2024-02-25
Payer: MEDICARE

## 2024-02-25 DIAGNOSIS — N39.0: Primary | ICD-10-CM

## 2024-02-25 LAB — GLUCOSE URINE UA: NEGATIVE MG/DL

## 2024-02-25 PROCEDURE — 81003 URINALYSIS AUTO W/O SCOPE: CPT

## 2024-02-25 PROCEDURE — 87186 SC STD MICRODIL/AGAR DIL: CPT

## 2024-02-25 PROCEDURE — 87086 URINE CULTURE/COLONY COUNT: CPT

## 2024-02-25 PROCEDURE — 87150 DNA/RNA AMPLIFIED PROBE: CPT

## 2024-03-14 ENCOUNTER — HOSPITAL ENCOUNTER
Dept: HOSPITAL 101 - PM | Age: 84
Discharge: HOME | End: 2024-03-14
Payer: MEDICARE

## 2024-03-14 DIAGNOSIS — Z79.891: ICD-10-CM

## 2024-03-14 DIAGNOSIS — E11.65: ICD-10-CM

## 2024-03-14 DIAGNOSIS — M47.816: ICD-10-CM

## 2024-03-14 DIAGNOSIS — M48.54XA: ICD-10-CM

## 2024-03-14 DIAGNOSIS — M54.50: ICD-10-CM

## 2024-03-14 DIAGNOSIS — M47.814: ICD-10-CM

## 2024-03-14 DIAGNOSIS — E66.01: Primary | ICD-10-CM

## 2024-03-14 DIAGNOSIS — N18.30: ICD-10-CM

## 2024-03-14 PROCEDURE — G0463 HOSPITAL OUTPT CLINIC VISIT: HCPCS

## 2024-06-13 ENCOUNTER — HOSPITAL ENCOUNTER
Dept: HOSPITAL 101 - PM | Age: 84
Discharge: HOME | End: 2024-06-13
Payer: MEDICARE

## 2024-06-13 DIAGNOSIS — M54.50: ICD-10-CM

## 2024-06-13 DIAGNOSIS — E66.01: Primary | ICD-10-CM

## 2024-06-13 DIAGNOSIS — M47.814: ICD-10-CM

## 2024-06-13 DIAGNOSIS — M47.816: ICD-10-CM

## 2024-06-13 DIAGNOSIS — Z79.891: ICD-10-CM

## 2024-06-13 DIAGNOSIS — E11.8: ICD-10-CM

## 2024-06-13 PROCEDURE — G0463 HOSPITAL OUTPT CLINIC VISIT: HCPCS

## 2024-09-12 ENCOUNTER — HOSPITAL ENCOUNTER
Dept: HOSPITAL 101 - PM | Age: 84
Discharge: HOME | End: 2024-09-12
Payer: MEDICARE

## 2024-09-12 DIAGNOSIS — S22.009A: ICD-10-CM

## 2024-09-12 DIAGNOSIS — E11.21: ICD-10-CM

## 2024-09-12 DIAGNOSIS — M47.814: ICD-10-CM

## 2024-09-12 DIAGNOSIS — Z79.891: ICD-10-CM

## 2024-09-12 DIAGNOSIS — E66.01: Primary | ICD-10-CM

## 2024-09-12 DIAGNOSIS — M54.50: ICD-10-CM

## 2024-09-12 DIAGNOSIS — N18.32: ICD-10-CM

## 2024-09-12 DIAGNOSIS — M47.816: ICD-10-CM

## 2024-09-12 PROCEDURE — G0463 HOSPITAL OUTPT CLINIC VISIT: HCPCS

## 2024-10-11 ENCOUNTER — HOSPITAL ENCOUNTER
Dept: HOSPITAL 101 - LAB | Age: 84
Discharge: HOME | End: 2024-10-11
Payer: MEDICARE

## 2024-10-11 DIAGNOSIS — E11.8: ICD-10-CM

## 2024-10-11 DIAGNOSIS — R06.02: Primary | ICD-10-CM

## 2024-10-11 DIAGNOSIS — E03.9: ICD-10-CM

## 2024-10-11 LAB
ADD MANUAL DIFF: NO
ALANINE AMINOTRANSFERASE: 22 U/L (ref 14–59)
ALBUMIN GLOBULIN RATIO: 0.7
ALBUMIN LEVEL: 3.2 G/DL (ref 3.4–5)
ALKALINE PHOSPHATASE: 84 U/L (ref 46–116)
ANION GAP: 10.2
ASPARTATE AMINO TRANSFERASE: 12 U/L (ref 15–37)
BLOOD UREA NITROGEN: 33 MG/DL (ref 7–18)
CALCIUM: 9.9 MG/DL (ref 8.5–10.1)
CARBON DIOXIDE: 29.7 MMOL/L (ref 21–32)
CHLORIDE: 103 MMOL/L (ref 98–107)
CHOLESTEROL: 152 MG/DL (ref ?–200)
CO2 BLD-SCNC: 29.7 MMOL/L (ref 21–32)
ESTIMATED GFR (AFRICAN AMERICA: 42
ESTIMATED GFR (NON-AFRICAN AME: 34
GLOBULIN: 4.6 G/DL
GLUCOSE BLD-MCNC: 85 MG/DL (ref 74–106)
HCT VFR BLD CALC: 39 % (ref 36–48)
HDL CHOLESTEROL: 40 MG/DL (ref 40–60)
HEMATOCRIT: 39 % (ref 36–48)
HEMOGLOBIN: 12.5 G/DL (ref 12–16)
IMMATURE GRANULOCYTES ABS AUTO: 0.02 10^3/UL (ref 0–0.03)
IMMATURE GRANULOCYTES PCT AUTO: 0.4 % (ref 0–0.5)
LYMPHOCYTES  ABSOLUTE AUTO: 1 10^3/UL (ref 1.2–3.8)
MCV RBC: 104.8 FL (ref 81–99)
MEAN CORPUSCULAR HEMOGLOBIN: 33.6 PG (ref 26.7–34)
MEAN CORPUSCULAR HGB CONC: 32.1 G/DL (ref 29.9–35.2)
MEAN CORPUSCULAR VOLUME: 104.8 FL (ref 81–99)
NT PRO B TYPE NATRIURETIC PEPT: 268 PG/ML (ref ?–1800)
PLATELET # BLD: 239 10^3/UL (ref 150–450)
PLATELET COUNT: 239 10^3/UL (ref 150–450)
POTASSIUM SERPLBLD-SCNC: 4.9 MMOL/L (ref 3.5–5.1)
POTASSIUM: 4.9 MMOL/L (ref 3.5–5.1)
RED BLOOD COUNT: 3.72 10^6/UL (ref 4.2–5.4)
SODIUM BLD-SCNC: 138 MMOL/L (ref 136–145)
SODIUM: 138 MMOL/L (ref 136–145)
THYROID STIMULATING HORMONE: 2.6 UIU/ML (ref 0.36–3.74)
TOTAL PROTEIN: 7.8 G/DL (ref 6.4–8.2)
TRIGLYCERIDES: 120 MG/DL (ref ?–150)
VLDL CHOLESTEROL: 24 MG/DL
WBC # BLD: 5.5 10^3/UL (ref 4–11)
WHITE BLOOD COUNT: 5.5 10^3/UL (ref 4–11)

## 2024-10-11 PROCEDURE — 80053 COMPREHEN METABOLIC PANEL: CPT

## 2024-10-11 PROCEDURE — 83880 ASSAY OF NATRIURETIC PEPTIDE: CPT

## 2024-10-11 PROCEDURE — 84443 ASSAY THYROID STIM HORMONE: CPT

## 2024-10-11 PROCEDURE — 84439 ASSAY OF FREE THYROXINE: CPT

## 2024-10-11 PROCEDURE — 80061 LIPID PANEL: CPT

## 2024-10-11 PROCEDURE — 85025 COMPLETE CBC W/AUTO DIFF WBC: CPT

## 2024-10-11 PROCEDURE — 83036 HEMOGLOBIN GLYCOSYLATED A1C: CPT

## 2024-10-11 PROCEDURE — 36415 COLL VENOUS BLD VENIPUNCTURE: CPT

## 2025-03-26 ENCOUNTER — HOSPITAL ENCOUNTER (EMERGENCY)
Age: 85
Discharge: ANOTHER HOSPITAL | End: 2025-03-26
Payer: MEDICARE

## 2025-03-26 ENCOUNTER — HOSPITAL ENCOUNTER
Age: 85
Discharge: HOME | End: 2025-03-26
Payer: MEDICARE

## 2025-03-26 VITALS — OXYGEN SATURATION: 98 % | HEART RATE: 40 BPM

## 2025-03-26 VITALS — TEMPERATURE: 97.8 F | SYSTOLIC BLOOD PRESSURE: 90 MMHG | DIASTOLIC BLOOD PRESSURE: 50 MMHG

## 2025-03-26 VITALS — BODY MASS INDEX: 47 KG/M2

## 2025-03-26 VITALS — OXYGEN SATURATION: 97 % | HEART RATE: 42 BPM

## 2025-03-26 VITALS — OXYGEN SATURATION: 99 % | HEART RATE: 37 BPM

## 2025-03-26 VITALS — HEART RATE: 46 BPM

## 2025-03-26 VITALS
OXYGEN SATURATION: 100 % | SYSTOLIC BLOOD PRESSURE: 92 MMHG | TEMPERATURE: 97.7 F | HEART RATE: 53 BPM | DIASTOLIC BLOOD PRESSURE: 48 MMHG

## 2025-03-26 VITALS — OXYGEN SATURATION: 100 % | HEART RATE: 36 BPM

## 2025-03-26 VITALS — HEART RATE: 52 BPM | SYSTOLIC BLOOD PRESSURE: 84 MMHG | OXYGEN SATURATION: 82 %

## 2025-03-26 VITALS
HEART RATE: 45 BPM | TEMPERATURE: 97.88 F | SYSTOLIC BLOOD PRESSURE: 90 MMHG | OXYGEN SATURATION: 100 % | DIASTOLIC BLOOD PRESSURE: 50 MMHG

## 2025-03-26 VITALS — HEART RATE: 47 BPM

## 2025-03-26 VITALS — OXYGEN SATURATION: 82 %

## 2025-03-26 VITALS — DIASTOLIC BLOOD PRESSURE: 42 MMHG | SYSTOLIC BLOOD PRESSURE: 90 MMHG

## 2025-03-26 VITALS — DIASTOLIC BLOOD PRESSURE: 38 MMHG | SYSTOLIC BLOOD PRESSURE: 82 MMHG

## 2025-03-26 VITALS — HEART RATE: 43 BPM | OXYGEN SATURATION: 99 %

## 2025-03-26 VITALS — HEART RATE: 48 BPM | OXYGEN SATURATION: 93 %

## 2025-03-26 VITALS — OXYGEN SATURATION: 100 % | HEART RATE: 30 BPM

## 2025-03-26 VITALS — OXYGEN SATURATION: 99 % | HEART RATE: 44 BPM

## 2025-03-26 VITALS — HEART RATE: 45 BPM

## 2025-03-26 VITALS — DIASTOLIC BLOOD PRESSURE: 50 MMHG | SYSTOLIC BLOOD PRESSURE: 92 MMHG

## 2025-03-26 VITALS — HEART RATE: 41 BPM | OXYGEN SATURATION: 100 %

## 2025-03-26 VITALS — HEART RATE: 42 BPM

## 2025-03-26 VITALS — OXYGEN SATURATION: 100 % | HEART RATE: 53 BPM

## 2025-03-26 DIAGNOSIS — E66.01: Primary | ICD-10-CM

## 2025-03-26 DIAGNOSIS — A41.9: Primary | ICD-10-CM

## 2025-03-26 DIAGNOSIS — Z79.4: ICD-10-CM

## 2025-03-26 DIAGNOSIS — M54.50: ICD-10-CM

## 2025-03-26 DIAGNOSIS — Z90.49: ICD-10-CM

## 2025-03-26 DIAGNOSIS — M47.816: ICD-10-CM

## 2025-03-26 DIAGNOSIS — N17.9: ICD-10-CM

## 2025-03-26 DIAGNOSIS — Z99.81: ICD-10-CM

## 2025-03-26 DIAGNOSIS — E11.65: ICD-10-CM

## 2025-03-26 DIAGNOSIS — Z79.84: ICD-10-CM

## 2025-03-26 DIAGNOSIS — Z90.710: ICD-10-CM

## 2025-03-26 DIAGNOSIS — Z79.01: ICD-10-CM

## 2025-03-26 DIAGNOSIS — K92.2: ICD-10-CM

## 2025-03-26 DIAGNOSIS — D64.9: ICD-10-CM

## 2025-03-26 DIAGNOSIS — Z79.891: ICD-10-CM

## 2025-03-26 DIAGNOSIS — Z99.3: ICD-10-CM

## 2025-03-26 DIAGNOSIS — M47.814: ICD-10-CM

## 2025-03-26 DIAGNOSIS — N18.32: ICD-10-CM

## 2025-03-26 DIAGNOSIS — E11.649: ICD-10-CM

## 2025-03-26 DIAGNOSIS — S22.009A: ICD-10-CM

## 2025-03-26 DIAGNOSIS — E87.5: ICD-10-CM

## 2025-03-26 DIAGNOSIS — Z96.659: ICD-10-CM

## 2025-03-26 LAB
ADD MANUAL DIFF: NO
ALANINE AMINOTRANSFERASE: 29 U/L (ref 14–59)
ALBUMIN GLOBULIN RATIO: 0.8
ALBUMIN LEVEL: 3 G/DL (ref 3.4–5)
ALKALINE PHOSPHATASE: 75 U/L (ref 46–116)
ALLEN TEST: POSITIVE
ANION GAP: 11
ASPARTATE AMINO TRANSFERASE: 18 U/L (ref 15–37)
BLOOD UREA NITROGEN: 63 MG/DL (ref 7–18)
CALCIUM: 9.4 MG/DL (ref 8.5–10.1)
CARBON DIOXIDE: 28.3 MMOL/L (ref 21–32)
CHLORIDE: 107 MMOL/L (ref 98–107)
CO2 BLD-SCNC: 28.3 MMOL/L (ref 21–32)
CO2 SERPLBLD-SCNC: 56.1 MMHG (ref 35–45)
ESTIMATED GFR (AFRICAN AMERICA: 23
ESTIMATED GFR (NON-AFRICAN AME: 19
GAS PNL BLDA: 56.1 MMHG (ref 35–45)
GLOBULIN: 4 G/DL
GLUCOSE BLD-MCNC: 91 MG/DL (ref 74–106)
HCO3 ABG: 27 MMOL/L (ref 22–26)
HCO3 STD BLDA-SCNC: 27 MMOL/L (ref 22–26)
HCT VFR BLD CALC: 23.7 % (ref 36–48)
HEMATOCRIT: 23.7 % (ref 36–48)
HEMOGLOBIN: 7.6 G/DL (ref 12–16)
IMMATURE GRANULOCYTES ABS AUTO: 0.03 10^3/UL (ref 0–0.03)
IMMATURE GRANULOCYTES PCT AUTO: 0.4 % (ref 0–0.5)
INR: 1.04
LACTATE/LACTIC ACID: 2.1 MMOL/L (ref 0.4–2)
LITERS PER MINUTE: 2
LYMPHOCYTES  ABSOLUTE AUTO: 1.2 10^3/UL (ref 1.2–3.8)
MCV RBC: 103.5 FL (ref 81–99)
MEAN CORPUSCULAR HEMOGLOBIN: 33.2 PG (ref 26.7–34)
MEAN CORPUSCULAR HGB CONC: 32.1 G/DL (ref 29.9–35.2)
MEAN CORPUSCULAR VOLUME: 103.5 FL (ref 81–99)
O2 MODE: (no result)
PLATELET # BLD: 207 10^3/UL (ref 150–450)
PLATELET COUNT: 207 10^3/UL (ref 150–450)
PO2 BLDA: 89.5 MMHG (ref 80–100)
POTASSIUM SERPLBLD-SCNC: 6.3 MMOL/L (ref 3.5–5.1)
POTASSIUM: 6.3 MMOL/L (ref 3.5–5.1)
PROTHROMBIN TIME: 11 SEC (ref 9–11.6)
PUNCTURE SITE: (no result)
RED BLOOD COUNT: 2.29 10^6/UL (ref 4.2–5.4)
SAO2 % BLDA: 95.3 %
SODIUM BLD-SCNC: 140 MMOL/L (ref 136–145)
SODIUM: 140 MMOL/L (ref 136–145)
TOTAL PROTEIN: 7 G/DL (ref 6.4–8.2)
WBC # BLD: 7.1 10^3/UL (ref 4–11)
WHITE BLOOD COUNT: 7.1 10^3/UL (ref 4–11)

## 2025-03-26 PROCEDURE — 96374 THER/PROPH/DIAG INJ IV PUSH: CPT

## 2025-03-26 PROCEDURE — G0463 HOSPITAL OUTPT CLINIC VISIT: HCPCS

## 2025-03-26 PROCEDURE — 96361 HYDRATE IV INFUSION ADD-ON: CPT

## 2025-03-26 PROCEDURE — 99285 EMERGENCY DEPT VISIT HI MDM: CPT

## 2025-03-26 PROCEDURE — 82805 BLOOD GASES W/O2 SATURATION: CPT

## 2025-03-26 PROCEDURE — 85610 PROTHROMBIN TIME: CPT

## 2025-03-26 PROCEDURE — 87040 BLOOD CULTURE FOR BACTERIA: CPT

## 2025-03-26 PROCEDURE — 36600 WITHDRAWAL OF ARTERIAL BLOOD: CPT

## 2025-03-26 PROCEDURE — 93005 ELECTROCARDIOGRAM TRACING: CPT

## 2025-03-26 PROCEDURE — 80053 COMPREHEN METABOLIC PANEL: CPT

## 2025-03-26 PROCEDURE — 86900 BLOOD TYPING SEROLOGIC ABO: CPT

## 2025-03-26 PROCEDURE — 86850 RBC ANTIBODY SCREEN: CPT

## 2025-03-26 PROCEDURE — 86901 BLOOD TYPING SEROLOGIC RH(D): CPT

## 2025-03-26 PROCEDURE — 96375 TX/PRO/DX INJ NEW DRUG ADDON: CPT

## 2025-03-26 PROCEDURE — 85025 COMPLETE CBC W/AUTO DIFF WBC: CPT

## 2025-03-26 PROCEDURE — 83605 ASSAY OF LACTIC ACID: CPT

## 2025-03-26 PROCEDURE — 36415 COLL VENOUS BLD VENIPUNCTURE: CPT

## 2025-03-26 PROCEDURE — P9016 RBC LEUKOCYTES REDUCED: HCPCS

## 2025-03-26 PROCEDURE — 36430 TRANSFUSION BLD/BLD COMPNT: CPT

## 2025-03-26 PROCEDURE — 84484 ASSAY OF TROPONIN QUANT: CPT

## 2025-04-09 ENCOUNTER — HOSPITAL ENCOUNTER (INPATIENT)
Dept: HOSPITAL 101 - ER | Age: 85
LOS: 2 days | Discharge: HOME | DRG: 690 | End: 2025-04-11
Payer: MEDICARE

## 2025-04-09 VITALS — DIASTOLIC BLOOD PRESSURE: 69 MMHG | OXYGEN SATURATION: 94 % | SYSTOLIC BLOOD PRESSURE: 164 MMHG

## 2025-04-09 VITALS — HEART RATE: 64 BPM | OXYGEN SATURATION: 93 %

## 2025-04-09 VITALS
TEMPERATURE: 97.6 F | HEART RATE: 64 BPM | OXYGEN SATURATION: 97 % | SYSTOLIC BLOOD PRESSURE: 149 MMHG | DIASTOLIC BLOOD PRESSURE: 71 MMHG

## 2025-04-09 VITALS
OXYGEN SATURATION: 95 % | TEMPERATURE: 97.4 F | SYSTOLIC BLOOD PRESSURE: 158 MMHG | HEART RATE: 66 BPM | DIASTOLIC BLOOD PRESSURE: 66 MMHG

## 2025-04-09 VITALS — OXYGEN SATURATION: 99 % | HEART RATE: 64 BPM

## 2025-04-09 VITALS — BODY MASS INDEX: 52.9 KG/M2 | BODY MASS INDEX: 55.8 KG/M2

## 2025-04-09 VITALS — OXYGEN SATURATION: 98 % | HEART RATE: 64 BPM

## 2025-04-09 VITALS — DIASTOLIC BLOOD PRESSURE: 94 MMHG | SYSTOLIC BLOOD PRESSURE: 134 MMHG

## 2025-04-09 VITALS
OXYGEN SATURATION: 95 % | HEART RATE: 70 BPM | TEMPERATURE: 97.6 F | SYSTOLIC BLOOD PRESSURE: 148 MMHG | DIASTOLIC BLOOD PRESSURE: 52 MMHG

## 2025-04-09 VITALS — HEART RATE: 61 BPM

## 2025-04-09 VITALS — HEART RATE: 61 BPM | SYSTOLIC BLOOD PRESSURE: 137 MMHG | OXYGEN SATURATION: 100 % | DIASTOLIC BLOOD PRESSURE: 65 MMHG

## 2025-04-09 VITALS — HEART RATE: 64 BPM

## 2025-04-09 VITALS — HEART RATE: 79 BPM

## 2025-04-09 VITALS — DIASTOLIC BLOOD PRESSURE: 57 MMHG | OXYGEN SATURATION: 96 % | HEART RATE: 64 BPM | SYSTOLIC BLOOD PRESSURE: 132 MMHG

## 2025-04-09 VITALS — HEART RATE: 60 BPM | OXYGEN SATURATION: 99 %

## 2025-04-09 VITALS — HEART RATE: 67 BPM

## 2025-04-09 VITALS — HEART RATE: 74 BPM | SYSTOLIC BLOOD PRESSURE: 137 MMHG | DIASTOLIC BLOOD PRESSURE: 68 MMHG | OXYGEN SATURATION: 85 %

## 2025-04-09 VITALS — OXYGEN SATURATION: 99 %

## 2025-04-09 DIAGNOSIS — K21.9: ICD-10-CM

## 2025-04-09 DIAGNOSIS — I48.0: ICD-10-CM

## 2025-04-09 DIAGNOSIS — Z79.890: ICD-10-CM

## 2025-04-09 DIAGNOSIS — E03.9: ICD-10-CM

## 2025-04-09 DIAGNOSIS — E66.01: ICD-10-CM

## 2025-04-09 DIAGNOSIS — E87.3: ICD-10-CM

## 2025-04-09 DIAGNOSIS — N39.0: Primary | ICD-10-CM

## 2025-04-09 DIAGNOSIS — Z99.3: ICD-10-CM

## 2025-04-09 DIAGNOSIS — M54.50: ICD-10-CM

## 2025-04-09 DIAGNOSIS — H61.21: ICD-10-CM

## 2025-04-09 DIAGNOSIS — N20.0: ICD-10-CM

## 2025-04-09 DIAGNOSIS — Z79.83: ICD-10-CM

## 2025-04-09 DIAGNOSIS — Z79.01: ICD-10-CM

## 2025-04-09 DIAGNOSIS — Z99.81: ICD-10-CM

## 2025-04-09 DIAGNOSIS — I89.0: ICD-10-CM

## 2025-04-09 DIAGNOSIS — G89.29: ICD-10-CM

## 2025-04-09 DIAGNOSIS — Z66: ICD-10-CM

## 2025-04-09 DIAGNOSIS — Z79.4: ICD-10-CM

## 2025-04-09 DIAGNOSIS — J96.11: ICD-10-CM

## 2025-04-09 DIAGNOSIS — N18.32: ICD-10-CM

## 2025-04-09 DIAGNOSIS — B96.89: ICD-10-CM

## 2025-04-09 DIAGNOSIS — I50.32: ICD-10-CM

## 2025-04-09 DIAGNOSIS — B96.1: ICD-10-CM

## 2025-04-09 DIAGNOSIS — Z79.84: ICD-10-CM

## 2025-04-09 DIAGNOSIS — E11.65: ICD-10-CM

## 2025-04-09 DIAGNOSIS — G25.81: ICD-10-CM

## 2025-04-09 LAB
ADD MANUAL DIFF: NO
ALANINE AMINOTRANSFERASE: 37 U/L (ref 14–59)
ALBUMIN GLOBULIN RATIO: 0.7
ALKALINE PHOSPHATASE: 100 U/L (ref 46–116)
ALLEN TEST: POSITIVE
ALLEN TEST: POSITIVE
ANION GAP: 6.7
ASPARTATE AMINO TRANSFERASE: 23 U/L (ref 15–37)
CALCIUM: 9.7 MG/DL (ref 8.5–10.1)
CANNABINOID SCREEN URINE: NEGATIVE
CAST SEEN?: (no result) #/LPF
CHLORIDE: 98 MMOL/L (ref 98–107)
CO2 BLD-SCNC: 38.3 MMOL/L (ref 21–32)
CO2 SERPLBLD-SCNC: 48.7 MMHG (ref 35–45)
CO2 SERPLBLD-SCNC: 55.2 MMHG (ref 35–45)
ESTIMATED GFR (AFRICAN AMERICA: 31
ESTIMATED GFR (NON-AFRICAN AME: 26
GLOBULIN: 4.4 G/DL
GLUCOSE SERPLBLD-MCNC: 170 MG/DL (ref 74–106)
GLUCOSE URINE UA: NEGATIVE MG/DL
HCO3 STD BLDA-SCNC: 37.6 MMOL/L (ref 22–26)
HCO3 STD BLDA-SCNC: 38.5 MMOL/L (ref 22–26)
HCT VFR BLD CALC: 31.3 % (ref 36–48)
HEMOGLOBIN: 10.1 G/DL (ref 12–16)
IMMATURE GRANULOCYTES ABS AUTO: 0.06 10^3/UL (ref 0–0.03)
IMMATURE GRANULOCYTES PCT AUTO: 0.7 % (ref 0–0.5)
INR: 0.99
LACTATE/LACTIC ACID: 1.2 MMOL/L (ref 0.4–2)
LITERS PER MINUTE: 2
LITERS PER MINUTE: 2
LYMPHOCYTES  ABSOLUTE AUTO: 1.3 10^3/UL (ref 1.2–3.8)
MAGNESIUM: 2.1 MG/DL (ref 1.8–2.4)
MCH RBC QN AUTO: 32.2 PG (ref 26.7–34)
MCV RBC: 99.7 FL (ref 81–99)
MEAN CORPUSCULAR HGB CONC: 32.3 G/DL (ref 29.9–35.2)
METHAMPHETAMINES SCREEN URINE: NEGATIVE
O2 MODE: (no result)
O2 MODE: (no result)
PLATELET # BLD: 286 10^3/UL (ref 150–450)
PO2 BLDA: 84.6 MMHG (ref 80–100)
PO2 BLDA: 85.5 MMHG (ref 80–100)
PROTHROMBIN TIME: 10.5 SEC (ref 9–11.6)
PUNCTURE SITE: (no result)
PUNCTURE SITE: (no result)
RBC # BLD AUTO: 3.14 10^6/UL (ref 4.2–5.4)
SAO2 % BLDA: 97.5 %
SAO2 % BLDA: 97.7 %
SODIUM BLD-SCNC: 139 MMOL/L (ref 136–145)
TOTAL PROTEIN: 7.4 G/DL (ref 6.4–8.2)
TRICYCLIC ANTIDEPRESSANT URINE: NEGATIVE
URINE CULTURE INDICATED: (no result)
WBC # BLD: 8.4 10^3/UL (ref 4–11)

## 2025-04-09 PROCEDURE — 94761 N-INVAS EAR/PLS OXIMETRY MLT: CPT

## 2025-04-09 PROCEDURE — 84484 ASSAY OF TROPONIN QUANT: CPT

## 2025-04-09 PROCEDURE — 70450 CT HEAD/BRAIN W/O DYE: CPT

## 2025-04-09 PROCEDURE — 83935 ASSAY OF URINE OSMOLALITY: CPT

## 2025-04-09 PROCEDURE — 84443 ASSAY THYROID STIM HORMONE: CPT

## 2025-04-09 PROCEDURE — 87088 URINE BACTERIA CULTURE: CPT

## 2025-04-09 PROCEDURE — 74176 CT ABD & PELVIS W/O CONTRAST: CPT

## 2025-04-09 PROCEDURE — 85025 COMPLETE CBC W/AUTO DIFF WBC: CPT

## 2025-04-09 PROCEDURE — 80053 COMPREHEN METABOLIC PANEL: CPT

## 2025-04-09 PROCEDURE — 84550 ASSAY OF BLOOD/URIC ACID: CPT

## 2025-04-09 PROCEDURE — 96366 THER/PROPH/DIAG IV INF ADDON: CPT

## 2025-04-09 PROCEDURE — 97162 PT EVAL MOD COMPLEX 30 MIN: CPT

## 2025-04-09 PROCEDURE — 36415 COLL VENOUS BLD VENIPUNCTURE: CPT

## 2025-04-09 PROCEDURE — 97165 OT EVAL LOW COMPLEX 30 MIN: CPT

## 2025-04-09 PROCEDURE — 83036 HEMOGLOBIN GLYCOSYLATED A1C: CPT

## 2025-04-09 PROCEDURE — 82948 REAGENT STRIP/BLOOD GLUCOSE: CPT

## 2025-04-09 PROCEDURE — 83605 ASSAY OF LACTIC ACID: CPT

## 2025-04-09 PROCEDURE — 82805 BLOOD GASES W/O2 SATURATION: CPT

## 2025-04-09 PROCEDURE — 99285 EMERGENCY DEPT VISIT HI MDM: CPT

## 2025-04-09 PROCEDURE — 93005 ELECTROCARDIOGRAM TRACING: CPT

## 2025-04-09 PROCEDURE — 83880 ASSAY OF NATRIURETIC PEPTIDE: CPT

## 2025-04-09 PROCEDURE — 71045 X-RAY EXAM CHEST 1 VIEW: CPT

## 2025-04-09 PROCEDURE — 96375 TX/PRO/DX INJ NEW DRUG ADDON: CPT

## 2025-04-09 PROCEDURE — 80307 DRUG TEST PRSMV CHEM ANLYZR: CPT

## 2025-04-09 PROCEDURE — 82570 ASSAY OF URINE CREATININE: CPT

## 2025-04-09 PROCEDURE — 84133 ASSAY OF URINE POTASSIUM: CPT

## 2025-04-09 PROCEDURE — 83735 ASSAY OF MAGNESIUM: CPT

## 2025-04-09 PROCEDURE — 85610 PROTHROMBIN TIME: CPT

## 2025-04-09 PROCEDURE — 82436 ASSAY OF URINE CHLORIDE: CPT

## 2025-04-09 PROCEDURE — 87040 BLOOD CULTURE FOR BACTERIA: CPT

## 2025-04-09 PROCEDURE — 96365 THER/PROPH/DIAG IV INF INIT: CPT

## 2025-04-09 PROCEDURE — 87186 SC STD MICRODIL/AGAR DIL: CPT

## 2025-04-09 PROCEDURE — 51702 INSERT TEMP BLADDER CATH: CPT

## 2025-04-09 PROCEDURE — G0378 HOSPITAL OBSERVATION PER HR: HCPCS

## 2025-04-09 PROCEDURE — 84300 ASSAY OF URINE SODIUM: CPT

## 2025-04-09 PROCEDURE — 96367 TX/PROPH/DG ADDL SEQ IV INF: CPT

## 2025-04-09 PROCEDURE — 87086 URINE CULTURE/COLONY COUNT: CPT

## 2025-04-09 PROCEDURE — 81001 URINALYSIS AUTO W/SCOPE: CPT

## 2025-04-09 PROCEDURE — 36600 WITHDRAWAL OF ARTERIAL BLOOD: CPT

## 2025-04-09 RX ADMIN — RIVAROXABAN 20 MG: 10 TABLET, FILM COATED ORAL at 19:38

## 2025-04-09 RX ADMIN — PANTOPRAZOLE SODIUM 40 MG: 40 TABLET, DELAYED RELEASE ORAL at 21:11

## 2025-04-09 RX ADMIN — NYSTATIN 1 APPLIC: 100000 POWDER TOPICAL at 21:11

## 2025-04-09 RX ADMIN — PIPERACILLIN SODIUM,TAZOBACTAM SODIUM 12.5 GM: 3; .375 INJECTION, POWDER, FOR SOLUTION INTRAVENOUS at 19:38

## 2025-04-10 VITALS — OXYGEN SATURATION: 96 %

## 2025-04-10 VITALS — HEART RATE: 69 BPM | OXYGEN SATURATION: 95 %

## 2025-04-10 VITALS
DIASTOLIC BLOOD PRESSURE: 69 MMHG | TEMPERATURE: 98.1 F | HEART RATE: 67 BPM | OXYGEN SATURATION: 97 % | SYSTOLIC BLOOD PRESSURE: 126 MMHG

## 2025-04-10 VITALS — OXYGEN SATURATION: 96 % | HEART RATE: 67 BPM

## 2025-04-10 VITALS
OXYGEN SATURATION: 95 % | HEART RATE: 87 BPM | SYSTOLIC BLOOD PRESSURE: 110 MMHG | TEMPERATURE: 97.9 F | DIASTOLIC BLOOD PRESSURE: 66 MMHG

## 2025-04-10 VITALS — SYSTOLIC BLOOD PRESSURE: 147 MMHG | HEART RATE: 69 BPM | DIASTOLIC BLOOD PRESSURE: 69 MMHG | OXYGEN SATURATION: 96 %

## 2025-04-10 VITALS
TEMPERATURE: 97.8 F | DIASTOLIC BLOOD PRESSURE: 52 MMHG | SYSTOLIC BLOOD PRESSURE: 134 MMHG | OXYGEN SATURATION: 95 % | HEART RATE: 70 BPM

## 2025-04-10 VITALS
OXYGEN SATURATION: 93 % | TEMPERATURE: 98.2 F | HEART RATE: 68 BPM | DIASTOLIC BLOOD PRESSURE: 66 MMHG | SYSTOLIC BLOOD PRESSURE: 114 MMHG

## 2025-04-10 VITALS
SYSTOLIC BLOOD PRESSURE: 115 MMHG | OXYGEN SATURATION: 95 % | HEART RATE: 71 BPM | DIASTOLIC BLOOD PRESSURE: 66 MMHG | TEMPERATURE: 97.7 F

## 2025-04-10 VITALS — HEART RATE: 64 BPM | OXYGEN SATURATION: 98 %

## 2025-04-10 VITALS — HEART RATE: 63 BPM | OXYGEN SATURATION: 98 %

## 2025-04-10 VITALS
HEART RATE: 65 BPM | SYSTOLIC BLOOD PRESSURE: 111 MMHG | TEMPERATURE: 98.06 F | OXYGEN SATURATION: 97 % | DIASTOLIC BLOOD PRESSURE: 77 MMHG

## 2025-04-10 VITALS — HEART RATE: 68 BPM | OXYGEN SATURATION: 96 %

## 2025-04-10 VITALS — OXYGEN SATURATION: 95 % | HEART RATE: 70 BPM

## 2025-04-10 VITALS — OXYGEN SATURATION: 95 % | HEART RATE: 71 BPM

## 2025-04-10 VITALS — OXYGEN SATURATION: 94 % | HEART RATE: 67 BPM

## 2025-04-10 VITALS — HEART RATE: 68 BPM | OXYGEN SATURATION: 97 %

## 2025-04-10 VITALS — OXYGEN SATURATION: 95 %

## 2025-04-10 VITALS — HEART RATE: 75 BPM | OXYGEN SATURATION: 95 %

## 2025-04-10 LAB
ADD MANUAL DIFF: NO
ALANINE AMINOTRANSFERASE: 30 U/L (ref 14–59)
ALBUMIN GLOBULIN RATIO: 0.7
ALBUMIN LEVEL: 2.7 G/DL (ref 3.4–5)
ALKALINE PHOSPHATASE: 87 U/L (ref 46–116)
ASPARTATE AMINO TRANSFERASE: 24 U/L (ref 15–37)
CALCIUM: 9.3 MG/DL (ref 8.5–10.1)
CHLORIDE: 100 MMOL/L (ref 98–107)
CO2 BLD-SCNC: 35.9 MMOL/L (ref 21–32)
ESTIMATED GFR (AFRICAN AMERICA: 35
ESTIMATED GFR (NON-AFRICAN AME: 29
GLOBULIN: 3.9 G/DL
GLUCOSE SERPLBLD-MCNC: 135 MG/DL (ref 74–106)
HCT VFR BLD CALC: 27.9 % (ref 36–48)
IMMATURE GRANULOCYTES ABS AUTO: 0.04 10^3/UL (ref 0–0.03)
IMMATURE GRANULOCYTES PCT AUTO: 0.5 % (ref 0–0.5)
LYMPHOCYTES  ABSOLUTE AUTO: 1.3 10^3/UL (ref 1.2–3.8)
MCH RBC QN AUTO: 32.1 PG (ref 26.7–34)
MCV RBC: 99.6 FL (ref 81–99)
MEAN CORPUSCULAR HGB CONC: 32.3 G/DL (ref 29.9–35.2)
PLATELET # BLD: 259 10^3/UL (ref 150–450)
POTASSIUM SERPLBLD-SCNC: 3.9 MMOL/L (ref 3.5–5.1)
SODIUM BLD-SCNC: 143 MMOL/L (ref 136–145)
THYROID STIMULATING HORMONE: 2.11 UIU/ML (ref 0.36–3.74)
TOTAL PROTEIN: 6.6 G/DL (ref 6.4–8.2)
URIC ACID: 8.1 MG/DL (ref 2.6–6)
WBC # BLD: 7.4 10^3/UL (ref 4–11)

## 2025-04-10 RX ADMIN — PROBENECID 500 MG: 500 TABLET, FILM COATED ORAL at 20:03

## 2025-04-10 RX ADMIN — INSULIN ASPART 0 UNIT: 100 INJECTION, SOLUTION INTRAVENOUS; SUBCUTANEOUS at 08:17

## 2025-04-10 RX ADMIN — PIPERACILLIN SODIUM,TAZOBACTAM SODIUM 12.5 GM: 3; .375 INJECTION, POWDER, FOR SOLUTION INTRAVENOUS at 08:14

## 2025-04-10 RX ADMIN — INSULIN GLARGINE 20 UNIT: 100 INJECTION, SOLUTION SUBCUTANEOUS at 13:13

## 2025-04-10 RX ADMIN — PIPERACILLIN SODIUM,TAZOBACTAM SODIUM 12.5 GM: 3; .375 INJECTION, POWDER, FOR SOLUTION INTRAVENOUS at 20:01

## 2025-04-10 RX ADMIN — INSULIN ASPART 0 UNIT: 100 INJECTION, SOLUTION INTRAVENOUS; SUBCUTANEOUS at 11:32

## 2025-04-10 RX ADMIN — RIVAROXABAN 20 MG: 10 TABLET, FILM COATED ORAL at 20:01

## 2025-04-10 RX ADMIN — NYSTATIN 1 APPLIC: 100000 POWDER TOPICAL at 08:15

## 2025-04-10 RX ADMIN — ACETAMINOPHEN 650 MG: 325 TABLET ORAL at 15:25

## 2025-04-10 RX ADMIN — PANTOPRAZOLE SODIUM 40 MG: 40 TABLET, DELAYED RELEASE ORAL at 08:15

## 2025-04-10 RX ADMIN — TRAMADOL HYDROCHLORIDE 50 MG: 50 TABLET, COATED ORAL at 15:24

## 2025-04-10 RX ADMIN — AMIODARONE HYDROCHLORIDE 200 MG: 200 TABLET ORAL at 08:15

## 2025-04-10 RX ADMIN — LEVOTHYROXINE SODIUM 125 MCG: 0.12 TABLET ORAL at 06:21

## 2025-04-10 RX ADMIN — INSULIN ASPART 0 UNIT: 100 INJECTION, SOLUTION INTRAVENOUS; SUBCUTANEOUS at 21:28

## 2025-04-10 RX ADMIN — SODIUM CHLORIDE 75 ML: 900 INJECTION, SOLUTION INTRAVENOUS at 08:14

## 2025-04-10 RX ADMIN — PANTOPRAZOLE SODIUM 40 MG: 40 TABLET, DELAYED RELEASE ORAL at 20:01

## 2025-04-10 RX ADMIN — NYSTATIN 1 APPLIC: 100000 POWDER TOPICAL at 20:03

## 2025-04-11 VITALS
OXYGEN SATURATION: 96 % | SYSTOLIC BLOOD PRESSURE: 123 MMHG | DIASTOLIC BLOOD PRESSURE: 53 MMHG | TEMPERATURE: 98.24 F | HEART RATE: 70 BPM

## 2025-04-11 VITALS
DIASTOLIC BLOOD PRESSURE: 68 MMHG | SYSTOLIC BLOOD PRESSURE: 114 MMHG | OXYGEN SATURATION: 94 % | HEART RATE: 71 BPM | TEMPERATURE: 97.8 F

## 2025-04-11 VITALS — OXYGEN SATURATION: 97 %

## 2025-04-11 VITALS
TEMPERATURE: 97.88 F | DIASTOLIC BLOOD PRESSURE: 69 MMHG | HEART RATE: 69 BPM | SYSTOLIC BLOOD PRESSURE: 126 MMHG | OXYGEN SATURATION: 95 %

## 2025-04-11 VITALS — HEART RATE: 73 BPM

## 2025-04-11 VITALS — HEART RATE: 67 BPM | OXYGEN SATURATION: 98 %

## 2025-04-11 VITALS — HEART RATE: 72 BPM

## 2025-04-11 VITALS — OXYGEN SATURATION: 98 % | HEART RATE: 69 BPM

## 2025-04-11 VITALS — OXYGEN SATURATION: 96 % | HEART RATE: 69 BPM

## 2025-04-11 VITALS — HEART RATE: 69 BPM | OXYGEN SATURATION: 96 %

## 2025-04-11 LAB
ADD MANUAL DIFF: NO
ALANINE AMINOTRANSFERASE: 26 U/L (ref 14–59)
ALBUMIN GLOBULIN RATIO: 0.7
ALBUMIN LEVEL: 2.6 G/DL (ref 3.4–5)
ALKALINE PHOSPHATASE: 85 U/L (ref 46–116)
ANION GAP: 5.3
ASPARTATE AMINO TRANSFERASE: 21 U/L (ref 15–37)
CALCIUM: 9.1 MG/DL (ref 8.5–10.1)
CHLORIDE: 103 MMOL/L (ref 98–107)
CO2 BLD-SCNC: 35.6 MMOL/L (ref 21–32)
ESTIMATED GFR (AFRICAN AMERICA: 31
ESTIMATED GFR (NON-AFRICAN AME: 26
GLUCOSE SERPLBLD-MCNC: 157 MG/DL (ref 74–106)
HCT VFR BLD CALC: 27.7 % (ref 36–48)
HEMOGLOBIN: 8.8 G/DL (ref 12–16)
IMMATURE GRANULOCYTES ABS AUTO: 0.04 10^3/UL (ref 0–0.03)
IMMATURE GRANULOCYTES PCT AUTO: 0.6 % (ref 0–0.5)
LYMPHOCYTES  ABSOLUTE AUTO: 1.5 10^3/UL (ref 1.2–3.8)
MCH RBC QN AUTO: 32.1 PG (ref 26.7–34)
MCV RBC: 101.1 FL (ref 81–99)
MEAN CORPUSCULAR HGB CONC: 31.8 G/DL (ref 29.9–35.2)
PLATELET # BLD: 227 10^3/UL (ref 150–450)
POTASSIUM SERPLBLD-SCNC: 3.9 MMOL/L (ref 3.5–5.1)
RBC # BLD AUTO: 2.74 10^6/UL (ref 4.2–5.4)
SODIUM BLD-SCNC: 140 MMOL/L (ref 136–145)
TOTAL PROTEIN: 6.6 G/DL (ref 6.4–8.2)

## 2025-04-11 RX ADMIN — INSULIN ASPART 0 UNIT: 100 INJECTION, SOLUTION INTRAVENOUS; SUBCUTANEOUS at 11:56

## 2025-04-11 RX ADMIN — AMIODARONE HYDROCHLORIDE 200 MG: 200 TABLET ORAL at 08:52

## 2025-04-11 RX ADMIN — PANTOPRAZOLE SODIUM 40 MG: 40 TABLET, DELAYED RELEASE ORAL at 08:52

## 2025-04-11 RX ADMIN — LEVOTHYROXINE SODIUM 125 MCG: 0.12 TABLET ORAL at 05:36

## 2025-04-11 RX ADMIN — PROBENECID 500 MG: 500 TABLET, FILM COATED ORAL at 08:52

## 2025-04-11 RX ADMIN — PIPERACILLIN SODIUM,TAZOBACTAM SODIUM 12.5 GM: 3; .375 INJECTION, POWDER, FOR SOLUTION INTRAVENOUS at 08:51

## 2025-04-11 RX ADMIN — NYSTATIN 1 APPLIC: 100000 POWDER TOPICAL at 08:53

## 2025-04-11 RX ADMIN — INSULIN ASPART 0 UNIT: 100 INJECTION, SOLUTION INTRAVENOUS; SUBCUTANEOUS at 08:52

## 2025-04-11 RX ADMIN — INSULIN GLARGINE 20 UNIT: 100 INJECTION, SOLUTION SUBCUTANEOUS at 08:52

## 2025-04-12 LAB — OSMOLALITY, URINE: 443 MOSMOL/KG

## 2025-05-07 ENCOUNTER — HOSPITAL ENCOUNTER (INPATIENT)
Dept: HOSPITAL 101 - ER | Age: 85
LOS: 3 days | Discharge: HOME HEALTH | DRG: 812 | End: 2025-05-10
Payer: MEDICARE

## 2025-05-07 VITALS — HEART RATE: 62 BPM

## 2025-05-07 VITALS
OXYGEN SATURATION: 99 % | SYSTOLIC BLOOD PRESSURE: 163 MMHG | HEART RATE: 78 BPM | TEMPERATURE: 97.52 F | DIASTOLIC BLOOD PRESSURE: 72 MMHG

## 2025-05-07 VITALS — HEART RATE: 98 BPM

## 2025-05-07 VITALS
BODY MASS INDEX: 51.5 KG/M2 | TEMPERATURE: 97.5 F | HEART RATE: 71 BPM | OXYGEN SATURATION: 99 % | SYSTOLIC BLOOD PRESSURE: 163 MMHG | DIASTOLIC BLOOD PRESSURE: 67 MMHG

## 2025-05-07 VITALS — HEART RATE: 64 BPM

## 2025-05-07 VITALS
SYSTOLIC BLOOD PRESSURE: 154 MMHG | HEART RATE: 74 BPM | BODY MASS INDEX: 57.6 KG/M2 | TEMPERATURE: 97.5 F | DIASTOLIC BLOOD PRESSURE: 84 MMHG | OXYGEN SATURATION: 97 %

## 2025-05-07 VITALS — OXYGEN SATURATION: 97 %

## 2025-05-07 VITALS — HEART RATE: 68 BPM

## 2025-05-07 VITALS — OXYGEN SATURATION: 100 % | HEART RATE: 61 BPM

## 2025-05-07 DIAGNOSIS — Z79.899: ICD-10-CM

## 2025-05-07 DIAGNOSIS — N18.32: ICD-10-CM

## 2025-05-07 DIAGNOSIS — J96.11: ICD-10-CM

## 2025-05-07 DIAGNOSIS — Z99.3: ICD-10-CM

## 2025-05-07 DIAGNOSIS — R15.9: ICD-10-CM

## 2025-05-07 DIAGNOSIS — Z79.01: ICD-10-CM

## 2025-05-07 DIAGNOSIS — Z99.81: ICD-10-CM

## 2025-05-07 DIAGNOSIS — E11.649: ICD-10-CM

## 2025-05-07 DIAGNOSIS — D50.0: Primary | ICD-10-CM

## 2025-05-07 DIAGNOSIS — K92.2: ICD-10-CM

## 2025-05-07 DIAGNOSIS — K21.9: ICD-10-CM

## 2025-05-07 DIAGNOSIS — T38.3X5A: ICD-10-CM

## 2025-05-07 DIAGNOSIS — Z96.659: ICD-10-CM

## 2025-05-07 DIAGNOSIS — Z79.890: ICD-10-CM

## 2025-05-07 DIAGNOSIS — Z88.8: ICD-10-CM

## 2025-05-07 DIAGNOSIS — R32: ICD-10-CM

## 2025-05-07 DIAGNOSIS — Z87.440: ICD-10-CM

## 2025-05-07 DIAGNOSIS — I48.0: ICD-10-CM

## 2025-05-07 DIAGNOSIS — E03.9: ICD-10-CM

## 2025-05-07 DIAGNOSIS — E11.22: ICD-10-CM

## 2025-05-07 DIAGNOSIS — Z79.84: ICD-10-CM

## 2025-05-07 DIAGNOSIS — I50.32: ICD-10-CM

## 2025-05-07 DIAGNOSIS — Z79.83: ICD-10-CM

## 2025-05-07 DIAGNOSIS — E66.01: ICD-10-CM

## 2025-05-07 LAB
ADD MANUAL DIFF: NO
ALANINE AMINOTRANSFERASE: 30 U/L (ref 14–59)
ALBUMIN GLOBULIN RATIO: 0.6
ALKALINE PHOSPHATASE: 109 U/L (ref 46–116)
ANION GAP: 8.5
ASPARTATE AMINO TRANSFERASE: 28 U/L (ref 15–37)
CALCIUM: 9.2 MG/DL (ref 8.5–10.1)
CAST SEEN?: (no result) #/LPF
CHLORIDE: 100 MMOL/L (ref 98–107)
CO2 BLD-SCNC: 35.7 MMOL/L (ref 21–32)
ESTIMATED GFR (AFRICAN AMERICA: 39
ESTIMATED GFR (NON-AFRICAN AME: 32
FREE T3: 0.93 PG/ML (ref 2.18–3.98)
GLOBULIN: 4.7 G/DL
GLUCOSE SERPLBLD-MCNC: 41 MG/DL (ref 74–106)
GLUCOSE URINE UA: NEGATIVE MG/DL
HCT VFR BLD CALC: 26.1 % (ref 36–48)
HEMOGLOBIN: 8.2 G/DL (ref 12–16)
IMMATURE GRANULOCYTES ABS AUTO: 0.04 10^3/UL (ref 0–0.03)
IMMATURE GRANULOCYTES PCT AUTO: 0.5 % (ref 0–0.5)
INR: 1.17
LACTATE/LACTIC ACID: 1.9 MMOL/L (ref 0.4–2)
LYMPHOCYTES  ABSOLUTE AUTO: 1.8 10^3/UL (ref 1.2–3.8)
MAGNESIUM: 2.2 MG/DL (ref 1.8–2.4)
MCH RBC QN AUTO: 31.4 PG (ref 26.7–34)
MEAN CORPUSCULAR HGB CONC: 31.4 G/DL (ref 29.9–35.2)
PLATELET # BLD: 313 10^3/UL (ref 150–450)
POTASSIUM SERPLBLD-SCNC: 4.2 MMOL/L (ref 3.5–5.1)
PROTHROMBIN TIME: 12.2 SEC (ref 9–11.6)
RBC # BLD AUTO: 2.61 10^6/UL (ref 4.2–5.4)
SODIUM BLD-SCNC: 140 MMOL/L (ref 136–145)
TOTAL PROTEIN: 7.7 G/DL (ref 6.4–8.2)
URINE CULTURE INDICATED: NO
WBC # BLD: 7.3 10^3/UL (ref 4–11)

## 2025-05-07 PROCEDURE — 83540 ASSAY OF IRON: CPT

## 2025-05-07 PROCEDURE — 96374 THER/PROPH/DIAG INJ IV PUSH: CPT

## 2025-05-07 PROCEDURE — 86850 RBC ANTIBODY SCREEN: CPT

## 2025-05-07 PROCEDURE — 84484 ASSAY OF TROPONIN QUANT: CPT

## 2025-05-07 PROCEDURE — 97161 PT EVAL LOW COMPLEX 20 MIN: CPT

## 2025-05-07 PROCEDURE — G0378 HOSPITAL OBSERVATION PER HR: HCPCS

## 2025-05-07 PROCEDURE — 96375 TX/PRO/DX INJ NEW DRUG ADDON: CPT

## 2025-05-07 PROCEDURE — 86901 BLOOD TYPING SEROLOGIC RH(D): CPT

## 2025-05-07 PROCEDURE — 83550 IRON BINDING TEST: CPT

## 2025-05-07 PROCEDURE — 99285 EMERGENCY DEPT VISIT HI MDM: CPT

## 2025-05-07 PROCEDURE — 71045 X-RAY EXAM CHEST 1 VIEW: CPT

## 2025-05-07 PROCEDURE — 86923 COMPATIBILITY TEST ELECTRIC: CPT

## 2025-05-07 PROCEDURE — 70450 CT HEAD/BRAIN W/O DYE: CPT

## 2025-05-07 PROCEDURE — 84481 FREE ASSAY (FT-3): CPT

## 2025-05-07 PROCEDURE — 86900 BLOOD TYPING SEROLOGIC ABO: CPT

## 2025-05-07 PROCEDURE — 97165 OT EVAL LOW COMPLEX 30 MIN: CPT

## 2025-05-07 PROCEDURE — 84439 ASSAY OF FREE THYROXINE: CPT

## 2025-05-07 PROCEDURE — 94761 N-INVAS EAR/PLS OXIMETRY MLT: CPT

## 2025-05-07 PROCEDURE — 80053 COMPREHEN METABOLIC PANEL: CPT

## 2025-05-07 PROCEDURE — 82746 ASSAY OF FOLIC ACID SERUM: CPT

## 2025-05-07 PROCEDURE — 83036 HEMOGLOBIN GLYCOSYLATED A1C: CPT

## 2025-05-07 PROCEDURE — 83735 ASSAY OF MAGNESIUM: CPT

## 2025-05-07 PROCEDURE — 83605 ASSAY OF LACTIC ACID: CPT

## 2025-05-07 PROCEDURE — 82728 ASSAY OF FERRITIN: CPT

## 2025-05-07 PROCEDURE — P9016 RBC LEUKOCYTES REDUCED: HCPCS

## 2025-05-07 PROCEDURE — 81001 URINALYSIS AUTO W/SCOPE: CPT

## 2025-05-07 PROCEDURE — 36415 COLL VENOUS BLD VENIPUNCTURE: CPT

## 2025-05-07 PROCEDURE — 85025 COMPLETE CBC W/AUTO DIFF WBC: CPT

## 2025-05-07 PROCEDURE — 85018 HEMOGLOBIN: CPT

## 2025-05-07 PROCEDURE — 82607 VITAMIN B-12: CPT

## 2025-05-07 PROCEDURE — 85610 PROTHROMBIN TIME: CPT

## 2025-05-07 PROCEDURE — 36430 TRANSFUSION BLD/BLD COMPNT: CPT

## 2025-05-07 PROCEDURE — 84443 ASSAY THYROID STIM HORMONE: CPT

## 2025-05-07 PROCEDURE — 85014 HEMATOCRIT: CPT

## 2025-05-07 PROCEDURE — 84466 ASSAY OF TRANSFERRIN: CPT

## 2025-05-07 PROCEDURE — 93005 ELECTROCARDIOGRAM TRACING: CPT

## 2025-05-07 PROCEDURE — 82948 REAGENT STRIP/BLOOD GLUCOSE: CPT

## 2025-05-07 RX ADMIN — DEXTROSE MONOHYDRATE AND SODIUM CHLORIDE 100 ML: 5; .45 INJECTION, SOLUTION INTRAVENOUS at 21:32

## 2025-05-07 RX ADMIN — PANTOPRAZOLE SODIUM 40 MG: 40 TABLET, DELAYED RELEASE ORAL at 21:31

## 2025-05-07 RX ADMIN — RIVAROXABAN 20 MG: 10 TABLET, FILM COATED ORAL at 21:31

## 2025-05-07 RX ADMIN — DEXTROSE MONOHYDRATE 50 ML: 25 INJECTION, SOLUTION INTRAVENOUS at 14:30

## 2025-05-08 VITALS
DIASTOLIC BLOOD PRESSURE: 67 MMHG | OXYGEN SATURATION: 96 % | HEART RATE: 70 BPM | SYSTOLIC BLOOD PRESSURE: 120 MMHG | TEMPERATURE: 98.06 F

## 2025-05-08 VITALS
DIASTOLIC BLOOD PRESSURE: 72 MMHG | TEMPERATURE: 98.06 F | SYSTOLIC BLOOD PRESSURE: 120 MMHG | OXYGEN SATURATION: 96 % | HEART RATE: 80 BPM

## 2025-05-08 VITALS
SYSTOLIC BLOOD PRESSURE: 126 MMHG | TEMPERATURE: 98 F | OXYGEN SATURATION: 98 % | DIASTOLIC BLOOD PRESSURE: 60 MMHG | HEART RATE: 68 BPM

## 2025-05-08 VITALS
DIASTOLIC BLOOD PRESSURE: 64 MMHG | TEMPERATURE: 98.2 F | OXYGEN SATURATION: 95 % | SYSTOLIC BLOOD PRESSURE: 125 MMHG | HEART RATE: 77 BPM

## 2025-05-08 VITALS
HEART RATE: 70 BPM | DIASTOLIC BLOOD PRESSURE: 70 MMHG | SYSTOLIC BLOOD PRESSURE: 120 MMHG | TEMPERATURE: 98 F | OXYGEN SATURATION: 96 %

## 2025-05-08 VITALS
SYSTOLIC BLOOD PRESSURE: 130 MMHG | DIASTOLIC BLOOD PRESSURE: 73 MMHG | HEART RATE: 78 BPM | TEMPERATURE: 98.2 F | OXYGEN SATURATION: 96 %

## 2025-05-08 VITALS
TEMPERATURE: 98.06 F | OXYGEN SATURATION: 98 % | HEART RATE: 68 BPM | SYSTOLIC BLOOD PRESSURE: 146 MMHG | DIASTOLIC BLOOD PRESSURE: 80 MMHG

## 2025-05-08 VITALS
HEART RATE: 84 BPM | OXYGEN SATURATION: 94 % | SYSTOLIC BLOOD PRESSURE: 134 MMHG | TEMPERATURE: 98.24 F | DIASTOLIC BLOOD PRESSURE: 52 MMHG

## 2025-05-08 VITALS
DIASTOLIC BLOOD PRESSURE: 65 MMHG | TEMPERATURE: 97.7 F | HEART RATE: 67 BPM | SYSTOLIC BLOOD PRESSURE: 136 MMHG | OXYGEN SATURATION: 98 %

## 2025-05-08 VITALS — OXYGEN SATURATION: 97 %

## 2025-05-08 VITALS
TEMPERATURE: 98 F | HEART RATE: 66 BPM | SYSTOLIC BLOOD PRESSURE: 125 MMHG | DIASTOLIC BLOOD PRESSURE: 71 MMHG | OXYGEN SATURATION: 98 %

## 2025-05-08 VITALS
SYSTOLIC BLOOD PRESSURE: 120 MMHG | TEMPERATURE: 98 F | DIASTOLIC BLOOD PRESSURE: 65 MMHG | HEART RATE: 70 BPM | OXYGEN SATURATION: 96 %

## 2025-05-08 VITALS
DIASTOLIC BLOOD PRESSURE: 72 MMHG | SYSTOLIC BLOOD PRESSURE: 130 MMHG | TEMPERATURE: 98.06 F | OXYGEN SATURATION: 97 % | HEART RATE: 76 BPM

## 2025-05-08 VITALS — OXYGEN SATURATION: 96 %

## 2025-05-08 VITALS
SYSTOLIC BLOOD PRESSURE: 128 MMHG | DIASTOLIC BLOOD PRESSURE: 70 MMHG | OXYGEN SATURATION: 97 % | HEART RATE: 70 BPM | TEMPERATURE: 98 F

## 2025-05-08 LAB
ADD MANUAL DIFF: NO
ALANINE AMINOTRANSFERASE: 26 U/L (ref 14–59)
ALBUMIN GLOBULIN RATIO: 0.6
ALBUMIN LEVEL: 2.5 G/DL (ref 3.4–5)
ALKALINE PHOSPHATASE: 87 U/L (ref 46–116)
ANION GAP: 7.2
ASPARTATE AMINO TRANSFERASE: 20 U/L (ref 15–37)
CALCIUM: 8.8 MG/DL (ref 8.5–10.1)
CHLORIDE: 101 MMOL/L (ref 98–107)
CO2 BLD-SCNC: 34.7 MMOL/L (ref 21–32)
ESTIMATED GFR (AFRICAN AMERICA: 38
ESTIMATED GFR (NON-AFRICAN AME: 32
GLOBULIN: 3.9 G/DL
GLUCOSE SERPLBLD-MCNC: 138 MG/DL (ref 74–106)
HCT VFR BLD CALC: 21.5 % (ref 36–48)
HCT VFR BLD CALC: 26.3 % (ref 36–48)
HEMOGLOBIN: 6.7 G/DL (ref 12–16)
HEMOGLOBIN: 6.8 G/DL (ref 12–16)
HEMOGLOBIN: 8.4 G/DL (ref 12–16)
IMMATURE GRANULOCYTES ABS AUTO: 0.03 10^3/UL (ref 0–0.03)
IMMATURE GRANULOCYTES PCT AUTO: 0.5 % (ref 0–0.5)
LYMPHOCYTES  ABSOLUTE AUTO: 1.5 10^3/UL (ref 1.2–3.8)
MCH RBC QN AUTO: 30.9 PG (ref 26.7–34)
MEAN CORPUSCULAR HGB CONC: 30.9 G/DL (ref 29.9–35.2)
PERCENT IRON SATURATION: 9.9 %
PLATELET # BLD: 251 10^3/UL (ref 150–450)
POTASSIUM SERPLBLD-SCNC: 3.9 MMOL/L (ref 3.5–5.1)
SODIUM BLD-SCNC: 139 MMOL/L (ref 136–145)
TOTAL PROTEIN: 6.4 G/DL (ref 6.4–8.2)
WBC # BLD: 6.1 10^3/UL (ref 4–11)

## 2025-05-08 RX ADMIN — PANTOPRAZOLE SODIUM 40 MG: 40 INJECTION, POWDER, FOR SOLUTION INTRAVENOUS at 21:38

## 2025-05-08 RX ADMIN — FUROSEMIDE 20 MG: 10 INJECTION, SOLUTION INTRAVENOUS at 10:03

## 2025-05-08 RX ADMIN — LOSARTAN POTASSIUM 100 MG: 50 TABLET, FILM COATED ORAL at 10:05

## 2025-05-08 RX ADMIN — SPIRONOLACTONE 25 MG: 25 TABLET, FILM COATED ORAL at 10:04

## 2025-05-08 RX ADMIN — POLYETHYLENE GLYCOL 3350 17 GM: 17 POWDER, FOR SOLUTION ORAL at 10:05

## 2025-05-08 RX ADMIN — DEXTROSE MONOHYDRATE AND SODIUM CHLORIDE 100 ML: 5; .45 INJECTION, SOLUTION INTRAVENOUS at 05:34

## 2025-05-08 RX ADMIN — PROBENECID 1000 MG: 500 TABLET, FILM COATED ORAL at 21:38

## 2025-05-08 RX ADMIN — PANTOPRAZOLE SODIUM 40 MG: 40 TABLET, DELAYED RELEASE ORAL at 10:04

## 2025-05-08 RX ADMIN — Medication 50 MCG: at 10:04

## 2025-05-08 RX ADMIN — PROBENECID 1000 MG: 500 TABLET, FILM COATED ORAL at 10:04

## 2025-05-08 RX ADMIN — FUROSEMIDE 40 MG: 40 TABLET ORAL at 10:05

## 2025-05-08 RX ADMIN — LEVOTHYROXINE SODIUM 125 MCG: 0.12 TABLET ORAL at 05:34

## 2025-05-08 RX ADMIN — METFORMIN HCL 500 MG: 500 TABLET ORAL at 10:04

## 2025-05-08 RX ADMIN — AMIODARONE HYDROCHLORIDE 200 MG: 200 TABLET ORAL at 10:03

## 2025-05-08 RX ADMIN — DULOXETINE 20 MG: 20 CAPSULE, DELAYED RELEASE ORAL at 10:05

## 2025-05-09 VITALS
SYSTOLIC BLOOD PRESSURE: 97 MMHG | OXYGEN SATURATION: 91 % | TEMPERATURE: 97.5 F | DIASTOLIC BLOOD PRESSURE: 64 MMHG | HEART RATE: 69 BPM

## 2025-05-09 VITALS — OXYGEN SATURATION: 100 %

## 2025-05-09 VITALS
TEMPERATURE: 97.34 F | OXYGEN SATURATION: 97 % | SYSTOLIC BLOOD PRESSURE: 133 MMHG | HEART RATE: 71 BPM | DIASTOLIC BLOOD PRESSURE: 73 MMHG

## 2025-05-09 VITALS
TEMPERATURE: 98.24 F | OXYGEN SATURATION: 97 % | HEART RATE: 70 BPM | SYSTOLIC BLOOD PRESSURE: 135 MMHG | DIASTOLIC BLOOD PRESSURE: 63 MMHG

## 2025-05-09 VITALS — OXYGEN SATURATION: 97 %

## 2025-05-09 VITALS — OXYGEN SATURATION: 95 %

## 2025-05-09 VITALS — OXYGEN SATURATION: 97 % | TEMPERATURE: 97.88 F | HEART RATE: 66 BPM

## 2025-05-09 LAB
ADD MANUAL DIFF: NO
ALANINE AMINOTRANSFERASE: 26 U/L (ref 14–59)
ALBUMIN GLOBULIN RATIO: 0.6
ALBUMIN LEVEL: 2.6 G/DL (ref 3.4–5)
ALKALINE PHOSPHATASE: 90 U/L (ref 46–116)
ANION GAP: 7.9
ASPARTATE AMINO TRANSFERASE: 21 U/L (ref 15–37)
CALCIUM: 8.8 MG/DL (ref 8.5–10.1)
CHLORIDE: 101 MMOL/L (ref 98–107)
CO2 BLD-SCNC: 34.1 MMOL/L (ref 21–32)
ESTIMATED GFR (AFRICAN AMERICA: 33
ESTIMATED GFR (NON-AFRICAN AME: 27
GLOBULIN: 4.1 G/DL
GLUCOSE SERPLBLD-MCNC: 195 MG/DL (ref 74–106)
HCT VFR BLD CALC: 27.7 % (ref 36–48)
HCT VFR BLD CALC: 28.2 % (ref 36–48)
HEMOGLOBIN: 8.7 G/DL (ref 12–16)
IMMATURE GRANULOCYTES ABS AUTO: 0.05 10^3/UL (ref 0–0.03)
IMMATURE GRANULOCYTES PCT AUTO: 0.6 % (ref 0–0.5)
LYMPHOCYTES  ABSOLUTE AUTO: 1.5 10^3/UL (ref 1.2–3.8)
MCH RBC QN AUTO: 30.4 PG (ref 26.7–34)
MCV RBC: 96.9 FL (ref 81–99)
MEAN CORPUSCULAR HGB CONC: 31.4 G/DL (ref 29.9–35.2)
PLATELET # BLD: 252 10^3/UL (ref 150–450)
RBC # BLD AUTO: 2.86 10^6/UL (ref 4.2–5.4)
SODIUM BLD-SCNC: 139 MMOL/L (ref 136–145)
TOTAL PROTEIN: 6.7 G/DL (ref 6.4–8.2)
TRANSFERRIN: 268 MG/DL (ref 149–313)
VITAMIN B12: 816 PG/ML (ref 232–1245)
WBC # BLD: 7.9 10^3/UL (ref 4–11)

## 2025-05-09 RX ADMIN — INSULIN GLARGINE 20 UNIT: 100 INJECTION, SOLUTION SUBCUTANEOUS at 09:46

## 2025-05-09 RX ADMIN — LEVOTHYROXINE SODIUM 125 MCG: 0.12 TABLET ORAL at 05:30

## 2025-05-09 RX ADMIN — AMIODARONE HYDROCHLORIDE 200 MG: 200 TABLET ORAL at 09:45

## 2025-05-09 RX ADMIN — POLYETHYLENE GLYCOL 3350 17 GM: 17 POWDER, FOR SOLUTION ORAL at 09:45

## 2025-05-09 RX ADMIN — FUROSEMIDE 40 MG: 40 TABLET ORAL at 09:45

## 2025-05-09 RX ADMIN — PROBENECID 1000 MG: 500 TABLET, FILM COATED ORAL at 21:23

## 2025-05-09 RX ADMIN — FUROSEMIDE 20 MG: 20 TABLET ORAL at 21:23

## 2025-05-09 RX ADMIN — PROBENECID 1000 MG: 500 TABLET, FILM COATED ORAL at 09:45

## 2025-05-09 RX ADMIN — METFORMIN HCL 500 MG: 500 TABLET ORAL at 09:45

## 2025-05-09 RX ADMIN — SPIRONOLACTONE 25 MG: 25 TABLET, FILM COATED ORAL at 09:45

## 2025-05-09 RX ADMIN — Medication 50 MCG: at 09:44

## 2025-05-09 RX ADMIN — LOSARTAN POTASSIUM 100 MG: 50 TABLET, FILM COATED ORAL at 09:45

## 2025-05-09 RX ADMIN — DULOXETINE 20 MG: 20 CAPSULE, DELAYED RELEASE ORAL at 09:45

## 2025-05-09 RX ADMIN — PANTOPRAZOLE SODIUM 40 MG: 40 TABLET, DELAYED RELEASE ORAL at 21:25

## 2025-05-10 VITALS
HEART RATE: 66 BPM | SYSTOLIC BLOOD PRESSURE: 139 MMHG | TEMPERATURE: 97.7 F | OXYGEN SATURATION: 94 % | DIASTOLIC BLOOD PRESSURE: 75 MMHG

## 2025-05-10 VITALS
TEMPERATURE: 97.7 F | OXYGEN SATURATION: 95 % | SYSTOLIC BLOOD PRESSURE: 118 MMHG | DIASTOLIC BLOOD PRESSURE: 61 MMHG | HEART RATE: 74 BPM

## 2025-05-10 VITALS — OXYGEN SATURATION: 97 %

## 2025-05-10 LAB
ADD MANUAL DIFF: NO
ALANINE AMINOTRANSFERASE: 25 U/L (ref 14–59)
ALBUMIN GLOBULIN RATIO: 0.6
ALBUMIN LEVEL: 2.5 G/DL (ref 3.4–5)
ALKALINE PHOSPHATASE: 82 U/L (ref 46–116)
ANION GAP: 6.8
ASPARTATE AMINO TRANSFERASE: 16 U/L (ref 15–37)
CALCIUM: 8.8 MG/DL (ref 8.5–10.1)
CHLORIDE: 103 MMOL/L (ref 98–107)
CO2 BLD-SCNC: 34.3 MMOL/L (ref 21–32)
ESTIMATED GFR (AFRICAN AMERICA: 35
ESTIMATED GFR (NON-AFRICAN AME: 28
GLOBULIN: 4.2 G/DL
GLUCOSE SERPLBLD-MCNC: 140 MG/DL (ref 74–106)
HCT VFR BLD CALC: 27.2 % (ref 36–48)
HEMOGLOBIN: 8.5 G/DL (ref 12–16)
IMMATURE GRANULOCYTES ABS AUTO: 0.04 10^3/UL (ref 0–0.03)
IMMATURE GRANULOCYTES PCT AUTO: 0.6 % (ref 0–0.5)
LYMPHOCYTES  ABSOLUTE AUTO: 1.3 10^3/UL (ref 1.2–3.8)
MCH RBC QN AUTO: 30.6 PG (ref 26.7–34)
MCV RBC: 97.8 FL (ref 81–99)
MEAN CORPUSCULAR HGB CONC: 31.3 G/DL (ref 29.9–35.2)
PLATELET # BLD: 236 10^3/UL (ref 150–450)
POTASSIUM SERPLBLD-SCNC: 4.1 MMOL/L (ref 3.5–5.1)
RBC # BLD AUTO: 2.78 10^6/UL (ref 4.2–5.4)
SODIUM BLD-SCNC: 140 MMOL/L (ref 136–145)
TOTAL PROTEIN: 6.7 G/DL (ref 6.4–8.2)

## 2025-05-10 RX ADMIN — SPIRONOLACTONE 25 MG: 25 TABLET, FILM COATED ORAL at 10:35

## 2025-05-10 RX ADMIN — POLYETHYLENE GLYCOL 3350 17 GM: 17 POWDER, FOR SOLUTION ORAL at 10:34

## 2025-05-10 RX ADMIN — AMIODARONE HYDROCHLORIDE 200 MG: 200 TABLET ORAL at 10:35

## 2025-05-10 RX ADMIN — METFORMIN HCL 500 MG: 500 TABLET ORAL at 10:35

## 2025-05-10 RX ADMIN — PROBENECID 1000 MG: 500 TABLET, FILM COATED ORAL at 10:34

## 2025-05-10 RX ADMIN — DULOXETINE 20 MG: 20 CAPSULE, DELAYED RELEASE ORAL at 10:34

## 2025-05-10 RX ADMIN — Medication 50 MCG: at 10:34

## 2025-05-10 RX ADMIN — LEVOTHYROXINE SODIUM 125 MCG: 0.12 TABLET ORAL at 05:56

## 2025-05-10 RX ADMIN — FUROSEMIDE 40 MG: 40 TABLET ORAL at 10:35

## 2025-05-10 RX ADMIN — PANTOPRAZOLE SODIUM 40 MG: 40 TABLET, DELAYED RELEASE ORAL at 10:35

## 2025-05-10 RX ADMIN — LOSARTAN POTASSIUM 100 MG: 50 TABLET, FILM COATED ORAL at 10:35

## 2025-05-10 RX ADMIN — INSULIN GLARGINE 20 UNIT: 100 INJECTION, SOLUTION SUBCUTANEOUS at 10:36

## 2025-05-12 ENCOUNTER — HOSPITAL ENCOUNTER (INPATIENT)
Dept: HOSPITAL 101 - ER | Age: 85
LOS: 3 days | Discharge: HOSPICE | DRG: 871 | End: 2025-05-15
Payer: MEDICARE

## 2025-05-12 VITALS — HEART RATE: 79 BPM | SYSTOLIC BLOOD PRESSURE: 90 MMHG | DIASTOLIC BLOOD PRESSURE: 54 MMHG | OXYGEN SATURATION: 90 %

## 2025-05-12 VITALS — HEART RATE: 72 BPM | OXYGEN SATURATION: 22 %

## 2025-05-12 VITALS — SYSTOLIC BLOOD PRESSURE: 141 MMHG | DIASTOLIC BLOOD PRESSURE: 56 MMHG | OXYGEN SATURATION: 92 %

## 2025-05-12 VITALS — HEART RATE: 75 BPM | OXYGEN SATURATION: 90 %

## 2025-05-12 VITALS — OXYGEN SATURATION: 92 % | HEART RATE: 79 BPM

## 2025-05-12 VITALS — OXYGEN SATURATION: 92 % | HEART RATE: 72 BPM

## 2025-05-12 VITALS — HEART RATE: 63 BPM | OXYGEN SATURATION: 93 %

## 2025-05-12 VITALS — OXYGEN SATURATION: 94 % | HEART RATE: 76 BPM

## 2025-05-12 VITALS — HEART RATE: 75 BPM | OXYGEN SATURATION: 89 %

## 2025-05-12 VITALS — HEART RATE: 65 BPM | OXYGEN SATURATION: 98 %

## 2025-05-12 VITALS — OXYGEN SATURATION: 91 % | HEART RATE: 77 BPM

## 2025-05-12 VITALS
HEART RATE: 93 BPM | DIASTOLIC BLOOD PRESSURE: 75 MMHG | SYSTOLIC BLOOD PRESSURE: 131 MMHG | TEMPERATURE: 98.96 F | OXYGEN SATURATION: 93 %

## 2025-05-12 VITALS — OXYGEN SATURATION: 93 % | HEART RATE: 78 BPM

## 2025-05-12 VITALS — HEART RATE: 65 BPM | OXYGEN SATURATION: 94 %

## 2025-05-12 VITALS — SYSTOLIC BLOOD PRESSURE: 106 MMHG | DIASTOLIC BLOOD PRESSURE: 58 MMHG | HEART RATE: 77 BPM

## 2025-05-12 VITALS — OXYGEN SATURATION: 92 % | HEART RATE: 82 BPM

## 2025-05-12 VITALS — OXYGEN SATURATION: 91 % | HEART RATE: 75 BPM

## 2025-05-12 VITALS — HEART RATE: 79 BPM | OXYGEN SATURATION: 92 %

## 2025-05-12 VITALS — OXYGEN SATURATION: 90 % | DIASTOLIC BLOOD PRESSURE: 55 MMHG | SYSTOLIC BLOOD PRESSURE: 100 MMHG | HEART RATE: 79 BPM

## 2025-05-12 VITALS — OXYGEN SATURATION: 95 %

## 2025-05-12 VITALS — OXYGEN SATURATION: 91 % | HEART RATE: 71 BPM

## 2025-05-12 VITALS
TEMPERATURE: 98.06 F | HEART RATE: 58 BPM | SYSTOLIC BLOOD PRESSURE: 112 MMHG | OXYGEN SATURATION: 95 % | DIASTOLIC BLOOD PRESSURE: 71 MMHG

## 2025-05-12 VITALS — DIASTOLIC BLOOD PRESSURE: 58 MMHG | TEMPERATURE: 98.78 F | SYSTOLIC BLOOD PRESSURE: 105 MMHG

## 2025-05-12 VITALS — OXYGEN SATURATION: 81 %

## 2025-05-12 VITALS — HEART RATE: 73 BPM | OXYGEN SATURATION: 90 %

## 2025-05-12 VITALS — HEART RATE: 73 BPM | OXYGEN SATURATION: 94 %

## 2025-05-12 VITALS — OXYGEN SATURATION: 83 %

## 2025-05-12 VITALS — HEART RATE: 77 BPM | OXYGEN SATURATION: 94 %

## 2025-05-12 VITALS
BODY MASS INDEX: 54.9 KG/M2 | DIASTOLIC BLOOD PRESSURE: 56 MMHG | TEMPERATURE: 99.68 F | HEART RATE: 87 BPM | SYSTOLIC BLOOD PRESSURE: 141 MMHG | OXYGEN SATURATION: 83 %

## 2025-05-12 VITALS — HEART RATE: 76 BPM | OXYGEN SATURATION: 91 %

## 2025-05-12 VITALS — HEART RATE: 74 BPM | OXYGEN SATURATION: 94 %

## 2025-05-12 VITALS — OXYGEN SATURATION: 88 % | HEART RATE: 74 BPM

## 2025-05-12 VITALS — OXYGEN SATURATION: 92 % | HEART RATE: 76 BPM

## 2025-05-12 VITALS — OXYGEN SATURATION: 89 % | HEART RATE: 73 BPM

## 2025-05-12 VITALS — OXYGEN SATURATION: 90 % | HEART RATE: 76 BPM

## 2025-05-12 VITALS — OXYGEN SATURATION: 93 % | HEART RATE: 75 BPM

## 2025-05-12 VITALS — HEART RATE: 74 BPM | OXYGEN SATURATION: 91 %

## 2025-05-12 VITALS — OXYGEN SATURATION: 98 %

## 2025-05-12 DIAGNOSIS — Z79.890: ICD-10-CM

## 2025-05-12 DIAGNOSIS — N17.9: ICD-10-CM

## 2025-05-12 DIAGNOSIS — Z88.8: ICD-10-CM

## 2025-05-12 DIAGNOSIS — A41.81: Primary | ICD-10-CM

## 2025-05-12 DIAGNOSIS — Z79.84: ICD-10-CM

## 2025-05-12 DIAGNOSIS — M47.814: ICD-10-CM

## 2025-05-12 DIAGNOSIS — G93.41: ICD-10-CM

## 2025-05-12 DIAGNOSIS — Z99.81: ICD-10-CM

## 2025-05-12 DIAGNOSIS — E11.649: ICD-10-CM

## 2025-05-12 DIAGNOSIS — Z79.4: ICD-10-CM

## 2025-05-12 DIAGNOSIS — Z87.891: ICD-10-CM

## 2025-05-12 DIAGNOSIS — K21.9: ICD-10-CM

## 2025-05-12 DIAGNOSIS — M79.7: ICD-10-CM

## 2025-05-12 DIAGNOSIS — Z79.83: ICD-10-CM

## 2025-05-12 DIAGNOSIS — I48.0: ICD-10-CM

## 2025-05-12 DIAGNOSIS — E03.9: ICD-10-CM

## 2025-05-12 DIAGNOSIS — Z79.01: ICD-10-CM

## 2025-05-12 DIAGNOSIS — Z74.01: ICD-10-CM

## 2025-05-12 DIAGNOSIS — E11.22: ICD-10-CM

## 2025-05-12 DIAGNOSIS — I50.33: ICD-10-CM

## 2025-05-12 DIAGNOSIS — D50.0: ICD-10-CM

## 2025-05-12 DIAGNOSIS — N18.32: ICD-10-CM

## 2025-05-12 DIAGNOSIS — J96.11: ICD-10-CM

## 2025-05-12 DIAGNOSIS — R65.20: ICD-10-CM

## 2025-05-12 DIAGNOSIS — R33.9: ICD-10-CM

## 2025-05-12 DIAGNOSIS — Z99.3: ICD-10-CM

## 2025-05-12 DIAGNOSIS — L03.116: ICD-10-CM

## 2025-05-12 DIAGNOSIS — G25.81: ICD-10-CM

## 2025-05-12 DIAGNOSIS — Z51.5: ICD-10-CM

## 2025-05-12 DIAGNOSIS — Z96.659: ICD-10-CM

## 2025-05-12 DIAGNOSIS — Z66: ICD-10-CM

## 2025-05-12 LAB
ADD MANUAL DIFF: YES
ANION GAP: 9.8
ANISOCYTOSIS BLD QL: (no result)
BAND NEUTROPHILS ABSOLUTE: 1.3 10^3/UL (ref 0–0.3)
CALCIUM: 9.3 MG/DL (ref 8.5–10.1)
CAST SEEN?: (no result) #/LPF
CHLORIDE: 98 MMOL/L (ref 98–107)
CO2 BLD-SCNC: 33.4 MMOL/L (ref 21–32)
ESTIMATED GFR (AFRICAN AMERICA: 31
ESTIMATED GFR (NON-AFRICAN AME: 26
GLUCOSE SERPLBLD-MCNC: 108 MG/DL (ref 74–106)
GLUCOSE URINE UA: NEGATIVE MG/DL
HCT VFR BLD CALC: 28.9 % (ref 36–48)
HEMOGLOBIN: 9.3 G/DL (ref 12–16)
LACTATE/LACTIC ACID: 1.4 MMOL/L (ref 0.4–2)
LYMPHOCYTES ABSOLUTE MANUAL: 0.76 10^3/UL (ref 1.2–3.8)
MCH RBC QN AUTO: 31.2 PG (ref 26.7–34)
MEAN CORPUSCULAR HGB CONC: 32.2 G/DL (ref 29.9–35.2)
PLATELET # BLD: 231 10^3/UL (ref 150–450)
POTASSIUM SERPLBLD-SCNC: 4.2 MMOL/L (ref 3.5–5.1)
RBC # BLD AUTO: 2.98 10^6/UL (ref 4.2–5.4)
SEGMENTED NEUT ABSOLUTE MANUAL: 22.86 10^3/UL (ref 1.4–6.5)
SODIUM BLD-SCNC: 137 MMOL/L (ref 136–145)
URINE CULTURE INDICATED: (no result)
WBC # BLD: 25.4 10^3/UL (ref 4–11)

## 2025-05-12 PROCEDURE — 84484 ASSAY OF TROPONIN QUANT: CPT

## 2025-05-12 PROCEDURE — 36415 COLL VENOUS BLD VENIPUNCTURE: CPT

## 2025-05-12 PROCEDURE — 87186 SC STD MICRODIL/AGAR DIL: CPT

## 2025-05-12 PROCEDURE — 80202 ASSAY OF VANCOMYCIN: CPT

## 2025-05-12 PROCEDURE — 80048 BASIC METABOLIC PNL TOTAL CA: CPT

## 2025-05-12 PROCEDURE — 99285 EMERGENCY DEPT VISIT HI MDM: CPT

## 2025-05-12 PROCEDURE — 85027 COMPLETE CBC AUTOMATED: CPT

## 2025-05-12 PROCEDURE — 87150 DNA/RNA AMPLIFIED PROBE: CPT

## 2025-05-12 PROCEDURE — 92610 EVALUATE SWALLOWING FUNCTION: CPT

## 2025-05-12 PROCEDURE — P9046 ALBUMIN (HUMAN), 25%, 20 ML: HCPCS

## 2025-05-12 PROCEDURE — 96361 HYDRATE IV INFUSION ADD-ON: CPT

## 2025-05-12 PROCEDURE — 94761 N-INVAS EAR/PLS OXIMETRY MLT: CPT

## 2025-05-12 PROCEDURE — 85652 RBC SED RATE AUTOMATED: CPT

## 2025-05-12 PROCEDURE — 93971 EXTREMITY STUDY: CPT

## 2025-05-12 PROCEDURE — 83880 ASSAY OF NATRIURETIC PEPTIDE: CPT

## 2025-05-12 PROCEDURE — 86140 C-REACTIVE PROTEIN: CPT

## 2025-05-12 PROCEDURE — 87040 BLOOD CULTURE FOR BACTERIA: CPT

## 2025-05-12 PROCEDURE — 80053 COMPREHEN METABOLIC PANEL: CPT

## 2025-05-12 PROCEDURE — 85007 BL SMEAR W/DIFF WBC COUNT: CPT

## 2025-05-12 PROCEDURE — 87086 URINE CULTURE/COLONY COUNT: CPT

## 2025-05-12 PROCEDURE — 76775 US EXAM ABDO BACK WALL LIM: CPT

## 2025-05-12 PROCEDURE — 71045 X-RAY EXAM CHEST 1 VIEW: CPT

## 2025-05-12 PROCEDURE — 85025 COMPLETE CBC W/AUTO DIFF WBC: CPT

## 2025-05-12 PROCEDURE — 83605 ASSAY OF LACTIC ACID: CPT

## 2025-05-12 PROCEDURE — 93306 TTE W/DOPPLER COMPLETE: CPT

## 2025-05-12 PROCEDURE — 81001 URINALYSIS AUTO W/SCOPE: CPT

## 2025-05-12 PROCEDURE — 94640 AIRWAY INHALATION TREATMENT: CPT

## 2025-05-12 PROCEDURE — 96365 THER/PROPH/DIAG IV INF INIT: CPT

## 2025-05-12 PROCEDURE — 87077 CULTURE AEROBIC IDENTIFY: CPT

## 2025-05-12 PROCEDURE — 82948 REAGENT STRIP/BLOOD GLUCOSE: CPT

## 2025-05-12 RX ADMIN — VANCOMYCIN HYDROCHLORIDE 250 MG: 1 INJECTION, POWDER, LYOPHILIZED, FOR SOLUTION INTRAVENOUS at 17:28

## 2025-05-12 RX ADMIN — HEPARIN SODIUM 5000 UNIT: 5000 INJECTION, SOLUTION INTRAVENOUS; SUBCUTANEOUS at 17:30

## 2025-05-12 RX ADMIN — CEFAZOLIN SODIUM 100 GM: 1 SOLUTION INTRAVENOUS at 13:03

## 2025-05-12 RX ADMIN — SODIUM CHLORIDE 1000 ML: 900 INJECTION, SOLUTION INTRAVENOUS at 10:33

## 2025-05-12 RX ADMIN — SODIUM CHLORIDE 200 ML: 900 INJECTION, SOLUTION INTRAVENOUS at 13:18

## 2025-05-12 RX ADMIN — IPRATROPIUM BROMIDE AND ALBUTEROL SULFATE 3 ML: .5; 2.5 SOLUTION RESPIRATORY (INHALATION) at 20:42

## 2025-05-12 RX ADMIN — PANTOPRAZOLE SODIUM 40 MG: 40 TABLET, DELAYED RELEASE ORAL at 21:09

## 2025-05-12 RX ADMIN — ACETAMINOPHEN 1000 MG: 500 TABLET ORAL at 21:12

## 2025-05-12 RX ADMIN — PROBENECID 500 MG: 500 TABLET, FILM COATED ORAL at 21:08

## 2025-05-12 RX ADMIN — PIPERACILLIN SODIUM,TAZOBACTAM SODIUM 12.5 GM: 3; .375 INJECTION, POWDER, FOR SOLUTION INTRAVENOUS at 19:42

## 2025-05-13 VITALS — OXYGEN SATURATION: 96 %

## 2025-05-13 VITALS
TEMPERATURE: 98.2 F | HEART RATE: 65 BPM | OXYGEN SATURATION: 94 % | SYSTOLIC BLOOD PRESSURE: 119 MMHG | DIASTOLIC BLOOD PRESSURE: 55 MMHG

## 2025-05-13 VITALS
HEART RATE: 64 BPM | DIASTOLIC BLOOD PRESSURE: 52 MMHG | OXYGEN SATURATION: 92 % | SYSTOLIC BLOOD PRESSURE: 91 MMHG | TEMPERATURE: 98.78 F

## 2025-05-13 VITALS — OXYGEN SATURATION: 93 %

## 2025-05-13 VITALS
DIASTOLIC BLOOD PRESSURE: 52 MMHG | TEMPERATURE: 98.06 F | SYSTOLIC BLOOD PRESSURE: 93 MMHG | OXYGEN SATURATION: 92 % | HEART RATE: 66 BPM

## 2025-05-13 VITALS — OXYGEN SATURATION: 95 % | DIASTOLIC BLOOD PRESSURE: 58 MMHG | HEART RATE: 61 BPM | SYSTOLIC BLOOD PRESSURE: 111 MMHG

## 2025-05-13 VITALS — SYSTOLIC BLOOD PRESSURE: 93 MMHG | DIASTOLIC BLOOD PRESSURE: 52 MMHG

## 2025-05-13 VITALS — TEMPERATURE: 98.1 F | OXYGEN SATURATION: 92 % | HEART RATE: 77 BPM

## 2025-05-13 LAB
A. CALCOACETICUS-BAUMANNII CPX: NOT DETECTED
ADD MANUAL DIFF: NO
ALANINE AMINOTRANSFERASE: 23 U/L (ref 14–59)
ALBUMIN GLOBULIN RATIO: 0.4
ALBUMIN LEVEL: 1.9 G/DL (ref 3.4–5)
ALKALINE PHOSPHATASE: 95 U/L (ref 46–116)
ANION GAP: 11.5
ASPARTATE AMINO TRANSFERASE: 31 U/L (ref 15–37)
BACTEROIDES FRAGILIS: NOT DETECTED
CALCIUM: 8.4 MG/DL (ref 8.5–10.1)
CANDIDA AURIS: NOT DETECTED
CANDIDA GLABRATA: NOT DETECTED
CHLORIDE: 103 MMOL/L (ref 98–107)
CO2 BLD-SCNC: 32.4 MMOL/L (ref 21–32)
CTX-M: (no result)
ENTEROBACTERALES: NOT DETECTED
ENTEROCOCCUS FAECALIS: DETECTED
ENTEROCOCCUS FAECIUM: NOT DETECTED
ESTIMATED GFR (AFRICAN AMERICA: 23
ESTIMATED GFR (NON-AFRICAN AME: 19
GLOBULIN: 4.3 G/DL
GLUCOSE SERPLBLD-MCNC: 81 MG/DL (ref 74–106)
HCT VFR BLD CALC: 24.8 % (ref 36–48)
HEMOGLOBIN: 7.8 G/DL (ref 12–16)
IMMATURE GRANULOCYTES ABS AUTO: 0.16 10^3/UL (ref 0–0.03)
IMP: (no result)
KLEBSIELLA AEROGENES: NOT DETECTED
KLEBSIELLA PNEUMONIAE GROUP: NOT DETECTED
KPC: (no result)
LACTATE/LACTIC ACID: 0.8 MMOL/L (ref 0.4–2)
LYMPHOCYTES  ABSOLUTE AUTO: 1.1 10^3/UL (ref 1.2–3.8)
MCH RBC QN AUTO: 30.8 PG (ref 26.7–34)
MCR-1: (no result)
MEAN CORPUSCULAR HGB CONC: 31.5 G/DL (ref 29.9–35.2)
MECA/C AND MREJ (MRSA): (no result)
MECA/C: (no result)
NDM: (no result)
OXA-48-LIKE: (no result)
PLATELET # BLD: 192 10^3/UL (ref 150–450)
POTASSIUM SERPLBLD-SCNC: 3.9 MMOL/L (ref 3.5–5.1)
PROTEUS SPP.: NOT DETECTED
RBC # BLD AUTO: 2.53 10^6/UL (ref 4.2–5.4)
SALMONELLA SPP.: NOT DETECTED
SERRATIA MARCESCENS: NOT DETECTED
SODIUM BLD-SCNC: 143 MMOL/L (ref 136–145)
SOURCE: (no result)
STAPHYLOCOCCUS EPIDERMIDIS: NOT DETECTED
STAPHYLOCOCCUS LUGDUNENSIS: NOT DETECTED
STAPHYLOCOCCUS SPP.: NOT DETECTED
STENOTROPHOMONAS MALTOPHILIA: NOT DETECTED
STREPTOCOCCUS PYOGENES: NOT DETECTED
STREPTOCOCCUS SPP.: NOT DETECTED
TOTAL PROTEIN: 6.2 G/DL (ref 6.4–8.2)
VANA/B: NOT DETECTED
VIM: (no result)
WBC # BLD: 15.4 10^3/UL (ref 4–11)

## 2025-05-13 RX ADMIN — PIPERACILLIN SODIUM,TAZOBACTAM SODIUM 12.5 GM: 3; .375 INJECTION, POWDER, FOR SOLUTION INTRAVENOUS at 16:28

## 2025-05-13 RX ADMIN — PIPERACILLIN SODIUM,TAZOBACTAM SODIUM 12.5 GM: 3; .375 INJECTION, POWDER, FOR SOLUTION INTRAVENOUS at 04:15

## 2025-05-13 RX ADMIN — PANTOPRAZOLE SODIUM 40 MG: 40 INJECTION, POWDER, FOR SOLUTION INTRAVENOUS at 20:34

## 2025-05-13 RX ADMIN — ACETAMINOPHEN 1000 MG: 500 TABLET ORAL at 20:33

## 2025-05-13 RX ADMIN — LEVOTHYROXINE SODIUM 125 MCG: 0.12 TABLET ORAL at 05:35

## 2025-05-13 RX ADMIN — HEPARIN SODIUM 5000 UNIT: 5000 INJECTION, SOLUTION INTRAVENOUS; SUBCUTANEOUS at 05:35

## 2025-05-14 VITALS
SYSTOLIC BLOOD PRESSURE: 106 MMHG | OXYGEN SATURATION: 95 % | HEART RATE: 62 BPM | DIASTOLIC BLOOD PRESSURE: 57 MMHG | TEMPERATURE: 97.7 F

## 2025-05-14 VITALS
DIASTOLIC BLOOD PRESSURE: 49 MMHG | OXYGEN SATURATION: 96 % | TEMPERATURE: 97.88 F | SYSTOLIC BLOOD PRESSURE: 102 MMHG | HEART RATE: 62 BPM

## 2025-05-14 VITALS
DIASTOLIC BLOOD PRESSURE: 65 MMHG | OXYGEN SATURATION: 95 % | SYSTOLIC BLOOD PRESSURE: 122 MMHG | HEART RATE: 59 BPM | TEMPERATURE: 97.8 F

## 2025-05-14 VITALS
SYSTOLIC BLOOD PRESSURE: 107 MMHG | OXYGEN SATURATION: 97 % | HEART RATE: 59 BPM | DIASTOLIC BLOOD PRESSURE: 50 MMHG | TEMPERATURE: 97.6 F

## 2025-05-14 VITALS
DIASTOLIC BLOOD PRESSURE: 81 MMHG | HEART RATE: 61 BPM | TEMPERATURE: 97.88 F | SYSTOLIC BLOOD PRESSURE: 138 MMHG | OXYGEN SATURATION: 96 %

## 2025-05-14 VITALS — OXYGEN SATURATION: 96 % | SYSTOLIC BLOOD PRESSURE: 122 MMHG | HEART RATE: 60 BPM | DIASTOLIC BLOOD PRESSURE: 63 MMHG

## 2025-05-14 VITALS — HEART RATE: 59 BPM

## 2025-05-14 VITALS — OXYGEN SATURATION: 97 %

## 2025-05-14 VITALS — OXYGEN SATURATION: 98 %

## 2025-05-14 LAB
ADD MANUAL DIFF: NO
ALANINE AMINOTRANSFERASE: 15 U/L (ref 14–59)
ALBUMIN GLOBULIN RATIO: 0.4
ALKALINE PHOSPHATASE: 114 U/L (ref 46–116)
ANION GAP: 11.9
ASPARTATE AMINO TRANSFERASE: 31 U/L (ref 15–37)
CALCIUM: 8.6 MG/DL (ref 8.5–10.1)
CHLORIDE: 102 MMOL/L (ref 98–107)
CO2 BLD-SCNC: 29.8 MMOL/L (ref 21–32)
ESTIMATED GFR (AFRICAN AMERICA: 20
ESTIMATED GFR (NON-AFRICAN AME: 16
GLOBULIN: 4.6 G/DL
GLUCOSE SERPLBLD-MCNC: 115 MG/DL (ref 74–106)
HCT VFR BLD CALC: 26.2 % (ref 36–48)
HEMOGLOBIN: 8.1 G/DL (ref 12–16)
IMMATURE GRANULOCYTES ABS AUTO: 0.07 10^3/UL (ref 0–0.03)
IMMATURE GRANULOCYTES PCT AUTO: 0.7 % (ref 0–0.5)
LYMPHOCYTES  ABSOLUTE AUTO: 1.3 10^3/UL (ref 1.2–3.8)
MCH RBC QN AUTO: 30.5 PG (ref 26.7–34)
MCV RBC: 98.5 FL (ref 81–99)
MEAN CORPUSCULAR HGB CONC: 30.9 G/DL (ref 29.9–35.2)
PLATELET # BLD: 215 10^3/UL (ref 150–450)
POTASSIUM SERPLBLD-SCNC: 3.7 MMOL/L (ref 3.5–5.1)
RBC # BLD AUTO: 2.66 10^6/UL (ref 4.2–5.4)
SODIUM BLD-SCNC: 140 MMOL/L (ref 136–145)
TOTAL PROTEIN: 6.6 G/DL (ref 6.4–8.2)
WBC # BLD: 10.5 10^3/UL (ref 4–11)

## 2025-05-14 RX ADMIN — LEVOTHYROXINE SODIUM 125 MCG: 0.12 TABLET ORAL at 06:00

## 2025-05-14 RX ADMIN — PIPERACILLIN SODIUM,TAZOBACTAM SODIUM 12.5 GM: 3; .375 INJECTION, POWDER, FOR SOLUTION INTRAVENOUS at 03:09

## 2025-05-14 RX ADMIN — INSULIN ASPART 0 UNIT: 100 INJECTION, SOLUTION INTRAVENOUS; SUBCUTANEOUS at 21:04

## 2025-05-14 RX ADMIN — ALBUMIN (HUMAN) 100 GM: 12.5 SOLUTION INTRAVENOUS at 11:21

## 2025-05-14 RX ADMIN — VANCOMYCIN HYDROCHLORIDE 250 MG: 1 INJECTION, POWDER, LYOPHILIZED, FOR SOLUTION INTRAVENOUS at 20:58

## 2025-05-14 RX ADMIN — FUROSEMIDE 40 MG: 10 INJECTION, SOLUTION INTRAMUSCULAR; INTRAVENOUS at 14:25

## 2025-05-14 RX ADMIN — PANTOPRAZOLE SODIUM 40 MG: 40 INJECTION, POWDER, FOR SOLUTION INTRAVENOUS at 20:58

## 2025-05-14 RX ADMIN — ACETAMINOPHEN 1000 MG: 500 TABLET ORAL at 10:36

## 2025-05-14 RX ADMIN — PIPERACILLIN SODIUM,TAZOBACTAM SODIUM 12.5 GM: 3; .375 INJECTION, POWDER, FOR SOLUTION INTRAVENOUS at 16:24

## 2025-05-15 VITALS
DIASTOLIC BLOOD PRESSURE: 77 MMHG | HEART RATE: 62 BPM | SYSTOLIC BLOOD PRESSURE: 144 MMHG | TEMPERATURE: 98 F | OXYGEN SATURATION: 98 %

## 2025-05-15 VITALS
TEMPERATURE: 98 F | SYSTOLIC BLOOD PRESSURE: 134 MMHG | OXYGEN SATURATION: 98 % | HEART RATE: 60 BPM | DIASTOLIC BLOOD PRESSURE: 71 MMHG

## 2025-05-15 VITALS
OXYGEN SATURATION: 95 % | TEMPERATURE: 97.7 F | SYSTOLIC BLOOD PRESSURE: 122 MMHG | DIASTOLIC BLOOD PRESSURE: 73 MMHG | HEART RATE: 59 BPM

## 2025-05-15 VITALS — HEART RATE: 58 BPM | OXYGEN SATURATION: 99 %

## 2025-05-15 VITALS — DIASTOLIC BLOOD PRESSURE: 73 MMHG | SYSTOLIC BLOOD PRESSURE: 122 MMHG

## 2025-05-15 LAB
ADD MANUAL DIFF: NO
ALANINE AMINOTRANSFERASE: 16 U/L (ref 14–59)
ALBUMIN GLOBULIN RATIO: 0.4
ALBUMIN LEVEL: 2.2 G/DL (ref 3.4–5)
ALKALINE PHOSPHATASE: 140 U/L (ref 46–116)
ANION GAP: 10.1
ASPARTATE AMINO TRANSFERASE: 37 U/L (ref 15–37)
CALCIUM: 8.9 MG/DL (ref 8.5–10.1)
CHLORIDE: 100 MMOL/L (ref 98–107)
CO2 BLD-SCNC: 30.9 MMOL/L (ref 21–32)
ESTIMATED GFR (AFRICAN AMERICA: 21
ESTIMATED GFR (NON-AFRICAN AME: 18
GLOBULIN: 4.9 G/DL
GLUCOSE SERPLBLD-MCNC: 148 MG/DL (ref 74–106)
HCT VFR BLD CALC: 24.9 % (ref 36–48)
HEMOGLOBIN: 7.9 G/DL (ref 12–16)
IMMATURE GRANULOCYTES ABS AUTO: 0.08 10^3/UL (ref 0–0.03)
IMMATURE GRANULOCYTES PCT AUTO: 0.7 % (ref 0–0.5)
LYMPHOCYTES  ABSOLUTE AUTO: 1.2 10^3/UL (ref 1.2–3.8)
MCH RBC QN AUTO: 30.9 PG (ref 26.7–34)
MCV RBC: 97.3 FL (ref 81–99)
MEAN CORPUSCULAR HGB CONC: 31.7 G/DL (ref 29.9–35.2)
PLATELET # BLD: 238 10^3/UL (ref 150–450)
RBC # BLD AUTO: 2.56 10^6/UL (ref 4.2–5.4)
SODIUM BLD-SCNC: 137 MMOL/L (ref 136–145)
TOTAL PROTEIN: 7.1 G/DL (ref 6.4–8.2)
WBC # BLD: 11.4 10^3/UL (ref 4–11)

## 2025-05-15 RX ADMIN — FUROSEMIDE 40 MG: 10 INJECTION, SOLUTION INTRAMUSCULAR; INTRAVENOUS at 09:19

## 2025-05-15 RX ADMIN — LEVOTHYROXINE SODIUM 125 MCG: 0.12 TABLET ORAL at 05:32

## 2025-05-15 RX ADMIN — IPRATROPIUM BROMIDE AND ALBUTEROL SULFATE 3 ML: .5; 2.5 SOLUTION RESPIRATORY (INHALATION) at 11:54

## 2025-05-15 RX ADMIN — PIPERACILLIN SODIUM,TAZOBACTAM SODIUM 12.5 GM: 3; .375 INJECTION, POWDER, FOR SOLUTION INTRAVENOUS at 05:32

## 2025-05-15 RX ADMIN — AMIODARONE HYDROCHLORIDE 200 MG: 200 TABLET ORAL at 09:19
